# Patient Record
Sex: FEMALE | Race: WHITE | Employment: OTHER | URBAN - NONMETROPOLITAN AREA
[De-identification: names, ages, dates, MRNs, and addresses within clinical notes are randomized per-mention and may not be internally consistent; named-entity substitution may affect disease eponyms.]

---

## 2017-09-18 ENCOUNTER — HOSPITAL ENCOUNTER (OUTPATIENT)
Age: 72
Discharge: HOME | End: 2017-09-18
Payer: MEDICARE

## 2017-09-18 ENCOUNTER — OFFICE VISIT (OUTPATIENT)
Dept: CARDIOLOGY CLINIC | Age: 72
End: 2017-09-18
Payer: MEDICARE

## 2017-09-18 ENCOUNTER — HOSPITAL ENCOUNTER (OUTPATIENT)
Dept: GENERAL RADIOLOGY | Age: 72
Discharge: HOME | End: 2017-09-18
Payer: MEDICARE

## 2017-09-18 VITALS
HEART RATE: 78 BPM | WEIGHT: 210 LBS | HEIGHT: 63 IN | SYSTOLIC BLOOD PRESSURE: 138 MMHG | DIASTOLIC BLOOD PRESSURE: 80 MMHG | BODY MASS INDEX: 37.21 KG/M2

## 2017-09-18 DIAGNOSIS — E78.01 FAMILIAL HYPERCHOLESTEROLEMIA: ICD-10-CM

## 2017-09-18 DIAGNOSIS — Z01.818 PREOP EXAMINATION: ICD-10-CM

## 2017-09-18 DIAGNOSIS — I25.10 CORONARY ARTERY DISEASE INVOLVING NATIVE CORONARY ARTERY OF NATIVE HEART WITHOUT ANGINA PECTORIS: ICD-10-CM

## 2017-09-18 DIAGNOSIS — I10 ESSENTIAL HYPERTENSION: Primary | ICD-10-CM

## 2017-09-18 PROCEDURE — 93000 ELECTROCARDIOGRAM COMPLETE: CPT | Performed by: NUCLEAR MEDICINE

## 2017-09-18 PROCEDURE — 3014F SCREEN MAMMO DOC REV: CPT | Performed by: NUCLEAR MEDICINE

## 2017-09-18 PROCEDURE — 3017F COLORECTAL CA SCREEN DOC REV: CPT | Performed by: NUCLEAR MEDICINE

## 2017-09-18 PROCEDURE — 4040F PNEUMOC VAC/ADMIN/RCVD: CPT | Performed by: NUCLEAR MEDICINE

## 2017-09-18 PROCEDURE — 1036F TOBACCO NON-USER: CPT | Performed by: NUCLEAR MEDICINE

## 2017-09-18 PROCEDURE — 1123F ACP DISCUSS/DSCN MKR DOCD: CPT | Performed by: NUCLEAR MEDICINE

## 2017-09-18 PROCEDURE — 71020 XR CHEST STANDARD TWO VW: CPT

## 2017-09-18 PROCEDURE — G8427 DOCREV CUR MEDS BY ELIG CLIN: HCPCS | Performed by: NUCLEAR MEDICINE

## 2017-09-18 PROCEDURE — G8417 CALC BMI ABV UP PARAM F/U: HCPCS | Performed by: NUCLEAR MEDICINE

## 2017-09-18 PROCEDURE — G8400 PT W/DXA NO RESULTS DOC: HCPCS | Performed by: NUCLEAR MEDICINE

## 2017-09-18 PROCEDURE — 99214 OFFICE O/P EST MOD 30 MIN: CPT | Performed by: NUCLEAR MEDICINE

## 2017-09-18 PROCEDURE — 1090F PRES/ABSN URINE INCON ASSESS: CPT | Performed by: NUCLEAR MEDICINE

## 2017-09-18 PROCEDURE — G8598 ASA/ANTIPLAT THER USED: HCPCS | Performed by: NUCLEAR MEDICINE

## 2017-09-19 ENCOUNTER — TELEPHONE (OUTPATIENT)
Dept: CARDIOLOGY CLINIC | Age: 72
End: 2017-09-19

## 2018-03-06 ENCOUNTER — TELEPHONE (OUTPATIENT)
Dept: CARDIOLOGY CLINIC | Age: 73
End: 2018-03-06

## 2018-03-06 DIAGNOSIS — I25.10 CORONARY ARTERY DISEASE INVOLVING NATIVE CORONARY ARTERY OF NATIVE HEART WITHOUT ANGINA PECTORIS: Primary | ICD-10-CM

## 2018-03-09 LAB
ANION GAP SERPL CALCULATED.3IONS-SCNC: 13 MEQ/L (ref 10–19)
BUN BLDV-MCNC: 14 MG/DL (ref 8–23)
CALCIUM SERPL-MCNC: 9.4 MG/DL (ref 8.5–10.5)
CHLORIDE BLD-SCNC: 98 MEQ/L (ref 95–107)
CO2: 28 MEQ/L (ref 19–31)
CREAT SERPL-MCNC: 1.1 MG/DL (ref 0.6–1.3)
EGFR AFRICAN AMERICAN: 57.7 ML/MIN/1.73 M2
EGFR IF NONAFRICAN AMERICAN: 49.8 ML/MIN/1.73 M2
GLUCOSE: 217 MG/DL (ref 70–99)
MAGNESIUM: 2.3 MG/DL (ref 1.6–2.6)
POTASSIUM SERPL-SCNC: 4.7 MEQ/L (ref 3.5–5.4)
SODIUM BLD-SCNC: 139 MEQ/L (ref 135–146)

## 2018-03-21 ENCOUNTER — TELEPHONE (OUTPATIENT)
Dept: CARDIOLOGY CLINIC | Age: 73
End: 2018-03-21

## 2018-09-17 ENCOUNTER — OFFICE VISIT (OUTPATIENT)
Dept: CARDIOLOGY CLINIC | Age: 73
End: 2018-09-17
Payer: MEDICARE

## 2018-09-17 VITALS
DIASTOLIC BLOOD PRESSURE: 70 MMHG | SYSTOLIC BLOOD PRESSURE: 142 MMHG | HEART RATE: 70 BPM | BODY MASS INDEX: 37.8 KG/M2 | WEIGHT: 205.4 LBS | HEIGHT: 62 IN

## 2018-09-17 DIAGNOSIS — E78.01 FAMILIAL HYPERCHOLESTEROLEMIA: ICD-10-CM

## 2018-09-17 DIAGNOSIS — I25.10 CORONARY ARTERY DISEASE INVOLVING NATIVE CORONARY ARTERY OF NATIVE HEART WITHOUT ANGINA PECTORIS: Primary | ICD-10-CM

## 2018-09-17 DIAGNOSIS — I10 ESSENTIAL HYPERTENSION: ICD-10-CM

## 2018-09-17 PROCEDURE — G8427 DOCREV CUR MEDS BY ELIG CLIN: HCPCS | Performed by: NUCLEAR MEDICINE

## 2018-09-17 PROCEDURE — 1101F PT FALLS ASSESS-DOCD LE1/YR: CPT | Performed by: NUCLEAR MEDICINE

## 2018-09-17 PROCEDURE — 1090F PRES/ABSN URINE INCON ASSESS: CPT | Performed by: NUCLEAR MEDICINE

## 2018-09-17 PROCEDURE — G8598 ASA/ANTIPLAT THER USED: HCPCS | Performed by: NUCLEAR MEDICINE

## 2018-09-17 PROCEDURE — 3017F COLORECTAL CA SCREEN DOC REV: CPT | Performed by: NUCLEAR MEDICINE

## 2018-09-17 PROCEDURE — 99214 OFFICE O/P EST MOD 30 MIN: CPT | Performed by: NUCLEAR MEDICINE

## 2018-09-17 PROCEDURE — 93000 ELECTROCARDIOGRAM COMPLETE: CPT | Performed by: NUCLEAR MEDICINE

## 2018-09-17 PROCEDURE — 4040F PNEUMOC VAC/ADMIN/RCVD: CPT | Performed by: NUCLEAR MEDICINE

## 2018-09-17 PROCEDURE — G8417 CALC BMI ABV UP PARAM F/U: HCPCS | Performed by: NUCLEAR MEDICINE

## 2018-09-17 PROCEDURE — 1123F ACP DISCUSS/DSCN MKR DOCD: CPT | Performed by: NUCLEAR MEDICINE

## 2018-09-17 PROCEDURE — G8400 PT W/DXA NO RESULTS DOC: HCPCS | Performed by: NUCLEAR MEDICINE

## 2018-09-17 PROCEDURE — 1036F TOBACCO NON-USER: CPT | Performed by: NUCLEAR MEDICINE

## 2018-09-17 NOTE — PROGRESS NOTES
[address] CARDIOLOGY  [facility]  [address] [address]  Dept: [phone]  Dept Fax: [phone]  Loc: [phone]    Visit Date: 9/17/2018    Sanket Rosado is a 67 y.o. female who presents today for:  Chief Complaint   Patient presents with    1 Year Follow Up    Coronary Artery Disease    Hypertension    Hyperlipidemia     No chest pain  Known CABG 2014  No changes in breathing  Some GI issues  Diverticulitis  BP is good    Some epigastric and back pain   Off of Repatha due to ? ? Side effects   HPI:  HPI  Past Medical History:   Diagnosis Date    Arthritis     Asthma     CAD (coronary artery disease)     Deep vein thrombosis (DVT) (HCC)     Escherichia coli infection     H/O coagulation disorder 5/30/2014    HLD (hyperlipidemia)     HTN (hypertension)     Hx of blood clots     Joint pain     Morbid [facility] ([facility]     NSTEMI (non-ST elevated myocardial infarction) ([facility] 5/30/2014    S/P cardiac catheterization: 5/30/2014: 99% distal RCA, 90% mid-LAD, 85% ostial diagonal, 60% LCx. 5/30/2014 5/30/2014: 99% distal RCA, 90% mid-LAD, 85% ostial diagonal, 60% LCx. Dr. Sangeeta Crawford Stroke [facility])     Swelling     Wheezing       Past Surgical History:   Procedure Laterality Date    ANKLE SURGERY  1990    CARDIOVASCULAR STRESS TEST  4 22 2010    No obvious stress induced ischemia. EF 71%.  CORONARY ARTERY BYPASS GRAFT  6-3-14     x4    DIAGNOSTIC CARDIAC CATH LAB PROCEDURE      EYE SURGERY      HYSTERECTOMY  1972    TRANSTHORACIC ECHOCARDIOGRAM  4 22 2010    Normal LV size and systolic function. EF 55-65%. Mild TR.       Family History   Problem Relation Age of Onset    Heart Disease Mother     Diabetes Mother     Heart Disease Father      Social History   Substance Use Topics    Smoking status: Former Smoker     Packs/day: 0.20     Years: 24.00     Types: Cigarettes     Quit date: 6/21/1982    Smokeless tobacco: Never Used   [facility] microalbuminuria test  04/21/2018    Flu vaccine (1) 09/01/2018    A1C test (Diabetic or Prediabetic)  06/01/2019    Lipid screen  06/01/2019    Potassium monitoring  06/01/2019    Creatinine monitoring  06/01/2019       Subjective:  Review of Systems  General:   No fever, no chills, No fatigue or weight loss  Pulmonary:    No dyspnea, no wheezing  Cardiac:    Denies recent chest pain,   GI:     No nausea or vomiting, no abdominal pain  Neuro:    No dizziness or light headedness,   Musculoskeletal:  No recent active issues  Extremities:   No edema, good peripheral pulses      Objective:  Physical Exam  BP (!) 144/60   Pulse 70   Ht 5' 2\" (1.575 m)   Wt 205 lb 6.4 oz (93.2 kg)   BMI 37.57 kg/m²   General:   Well developed, well nourished  Lungs:   Clear to auscultation  Heart:    Normal S1 S2, Slight murmur. no rubs, no gallops  Abdomen:   Soft, non tender, no organomegalies, positive bowel sounds  Extremities:   No edema, no cyanosis, good peripheral pulses  Neurological:   Awake, alert, oriented. No obvious focal deficits  Musculoskelatal:  No obvious deformities    Assessment:      Diagnosis Orders   1. Coronary artery disease involving native coronary artery of native heart without angina pectoris  EKG 12 lead   2. Essential hypertension     3. Familial hypercholesterolemia     cardiac higher risk patient   different symptoms   ECG in office was done today. I reviewed the ECG. No acute findings    Plan:  No Follow-up on file. I have spent 25 minutes face to face with the patient. More than 50% of this time was spent counseling and coordinating care.     Discussed a stress test given her risk   She wants to wait   She feels that her GI issues is priority   I am worried that some of the symptoms could be cardiac  Patient was advised to report to the ER if he has recurrent symptoms with specific instructions given about severity and duration of symptoms  Continue risk factor modification and medical management  Thank you for allowing me to participate in the care of your patient. Please don't hesitate to contact me regarding any further issues related to the patient care    Orders Placed:  Orders Placed This Encounter   Procedures    EKG 12 lead     Order Specific Question:   Reason for Exam?     Answer: Other       Medications Prescribed:  No orders of the defined types were placed in this encounter. Discussed use, benefit, and side effects of prescribed medications. All patient questions answered. Pt voiced understanding. Instructed to continue current medications, diet and exercise. Continue risk factor modification and medical management. Patient agreed with treatment plan. Follow up as directed.     Electronically signed by Leana Leavitt MD on 9/17/2018 at 10:37 AM

## 2019-07-01 ENCOUNTER — HOSPITAL ENCOUNTER (OUTPATIENT)
Age: 74
Discharge: HOME | End: 2019-07-01
Payer: MEDICARE

## 2019-07-01 ENCOUNTER — HOSPITAL ENCOUNTER (OUTPATIENT)
Dept: GENERAL RADIOLOGY | Age: 74
Discharge: HOME | End: 2019-07-01
Payer: MEDICARE

## 2019-07-01 DIAGNOSIS — Z01.810 PRE-OPERATIVE CARDIOVASCULAR EXAMINATION: ICD-10-CM

## 2019-07-01 PROCEDURE — 71046 X-RAY EXAM CHEST 2 VIEWS: CPT

## 2019-09-16 ENCOUNTER — OFFICE VISIT (OUTPATIENT)
Dept: CARDIOLOGY CLINIC | Age: 74
End: 2019-09-16
Payer: MEDICARE

## 2019-09-16 VITALS
DIASTOLIC BLOOD PRESSURE: 58 MMHG | HEART RATE: 60 BPM | WEIGHT: 201.8 LBS | BODY MASS INDEX: 37.13 KG/M2 | HEIGHT: 62 IN | SYSTOLIC BLOOD PRESSURE: 128 MMHG

## 2019-09-16 DIAGNOSIS — E78.01 FAMILIAL HYPERCHOLESTEROLEMIA: ICD-10-CM

## 2019-09-16 DIAGNOSIS — I25.810 CORONARY ARTERY DISEASE INVOLVING CORONARY BYPASS GRAFT OF NATIVE HEART WITHOUT ANGINA PECTORIS: ICD-10-CM

## 2019-09-16 DIAGNOSIS — I10 ESSENTIAL HYPERTENSION: Primary | ICD-10-CM

## 2019-09-16 PROCEDURE — 99213 OFFICE O/P EST LOW 20 MIN: CPT | Performed by: NUCLEAR MEDICINE

## 2019-09-16 PROCEDURE — G8400 PT W/DXA NO RESULTS DOC: HCPCS | Performed by: NUCLEAR MEDICINE

## 2019-09-16 PROCEDURE — G8598 ASA/ANTIPLAT THER USED: HCPCS | Performed by: NUCLEAR MEDICINE

## 2019-09-16 PROCEDURE — 1036F TOBACCO NON-USER: CPT | Performed by: NUCLEAR MEDICINE

## 2019-09-16 PROCEDURE — G8417 CALC BMI ABV UP PARAM F/U: HCPCS | Performed by: NUCLEAR MEDICINE

## 2019-09-16 PROCEDURE — G8428 CUR MEDS NOT DOCUMENT: HCPCS | Performed by: NUCLEAR MEDICINE

## 2019-09-16 PROCEDURE — 3017F COLORECTAL CA SCREEN DOC REV: CPT | Performed by: NUCLEAR MEDICINE

## 2019-09-16 PROCEDURE — 1123F ACP DISCUSS/DSCN MKR DOCD: CPT | Performed by: NUCLEAR MEDICINE

## 2019-09-16 PROCEDURE — 1090F PRES/ABSN URINE INCON ASSESS: CPT | Performed by: NUCLEAR MEDICINE

## 2019-09-16 PROCEDURE — 93000 ELECTROCARDIOGRAM COMPLETE: CPT | Performed by: NUCLEAR MEDICINE

## 2019-09-16 PROCEDURE — 4040F PNEUMOC VAC/ADMIN/RCVD: CPT | Performed by: NUCLEAR MEDICINE

## 2019-09-16 NOTE — PROGRESS NOTES
defined types were placed in this encounter. Discussed use, benefit, and side effects of prescribed medications. All patient questions answered. Pt voicedunderstanding. Instructed to continue current medications, diet and exercise. Continue risk factor modification and medical management. Patient agreed with treatment plan. Follow up as directed.     Electronically signedby Rafael Jacobs MD on 9/16/2019 at 11:24 AM

## 2019-12-03 ENCOUNTER — HOSPITAL ENCOUNTER (OUTPATIENT)
Dept: INTERVENTIONAL RADIOLOGY/VASCULAR | Age: 74
Discharge: HOME | End: 2019-12-03
Payer: MEDICARE

## 2019-12-03 DIAGNOSIS — R09.89 BILATERAL CAROTID BRUITS: ICD-10-CM

## 2019-12-03 PROCEDURE — 93880 EXTRACRANIAL BILAT STUDY: CPT

## 2020-01-21 ENCOUNTER — HOSPITAL ENCOUNTER (EMERGENCY)
Age: 75
Discharge: HOME | End: 2020-01-21
Attending: EMERGENCY MEDICINE
Payer: MEDICARE

## 2020-01-21 ENCOUNTER — APPOINTMENT (OUTPATIENT)
Dept: GENERAL RADIOLOGY | Age: 75
End: 2020-01-21
Payer: MEDICARE

## 2020-01-21 VITALS
TEMPERATURE: 97.6 F | HEART RATE: 75 BPM | WEIGHT: 200 LBS | HEIGHT: 62 IN | BODY MASS INDEX: 36.8 KG/M2 | RESPIRATION RATE: 18 BRPM | DIASTOLIC BLOOD PRESSURE: 58 MMHG | OXYGEN SATURATION: 97 % | SYSTOLIC BLOOD PRESSURE: 120 MMHG

## 2020-01-21 LAB
ALBUMIN SERPL-MCNC: 4.5 G/DL (ref 3.5–5.1)
ALBUMIN SERPL-MCNC: 4.6 G/DL (ref 3.5–5.1)
ALP BLD-CCNC: 68 U/L (ref 38–126)
ALP BLD-CCNC: 68 U/L (ref 38–126)
ALT SERPL-CCNC: 12 U/L (ref 11–66)
ALT SERPL-CCNC: 12 U/L (ref 11–66)
ANION GAP SERPL CALCULATED.3IONS-SCNC: 15 MEQ/L (ref 8–16)
APTT: 41.3 SECONDS (ref 22–38)
AST SERPL-CCNC: 18 U/L (ref 5–40)
AST SERPL-CCNC: 18 U/L (ref 5–40)
BACTERIA: ABNORMAL /HPF
BASOPHILS # BLD: 0.6 %
BASOPHILS ABSOLUTE: 0 THOU/MM3 (ref 0–0.1)
BILIRUB SERPL-MCNC: 0.8 MG/DL (ref 0.3–1.2)
BILIRUB SERPL-MCNC: 0.8 MG/DL (ref 0.3–1.2)
BILIRUBIN DIRECT: < 0.2 MG/DL (ref 0–0.3)
BILIRUBIN URINE: NEGATIVE
BLOOD, URINE: NEGATIVE
BUN BLDV-MCNC: 25 MG/DL (ref 7–22)
CALCIUM SERPL-MCNC: 9.5 MG/DL (ref 8.5–10.5)
CASTS 2: ABNORMAL /LPF
CASTS UA: ABNORMAL /LPF
CHARACTER, URINE: ABNORMAL
CHLORIDE BLD-SCNC: 98 MEQ/L (ref 98–111)
CO2: 26 MEQ/L (ref 23–33)
COLOR: YELLOW
CREAT SERPL-MCNC: 1 MG/DL (ref 0.4–1.2)
CRYSTALS, UA: ABNORMAL
EKG ATRIAL RATE: 66 BPM
EKG P AXIS: 43 DEGREES
EKG P-R INTERVAL: 174 MS
EKG Q-T INTERVAL: 404 MS
EKG QRS DURATION: 86 MS
EKG QTC CALCULATION (BAZETT): 423 MS
EKG R AXIS: -35 DEGREES
EKG T AXIS: 36 DEGREES
EKG VENTRICULAR RATE: 66 BPM
EOSINOPHIL # BLD: 1.7 %
EOSINOPHILS ABSOLUTE: 0.1 THOU/MM3 (ref 0–0.4)
EPITHELIAL CELLS, UA: ABNORMAL /HPF
ERYTHROCYTE [DISTWIDTH] IN BLOOD BY AUTOMATED COUNT: 13.3 % (ref 11.5–14.5)
ERYTHROCYTE [DISTWIDTH] IN BLOOD BY AUTOMATED COUNT: 45.6 FL (ref 35–45)
GFR SERPL CREATININE-BSD FRML MDRD: 54 ML/MIN/1.73M2
GLUCOSE BLD-MCNC: 170 MG/DL (ref 70–108)
GLUCOSE URINE: NEGATIVE MG/DL
HCT VFR BLD CALC: 43.4 % (ref 37–47)
HEMOGLOBIN: 14.6 GM/DL (ref 12–16)
IMMATURE GRANS (ABS): 0.03 THOU/MM3 (ref 0–0.07)
IMMATURE GRANULOCYTES: 0.5 %
INR BLD: 2.53 (ref 0.85–1.13)
KETONES, URINE: NEGATIVE
LEUKOCYTE ESTERASE, URINE: ABNORMAL
LIPASE: 34.8 U/L (ref 5.6–51.3)
LYMPHOCYTES # BLD: 38.7 %
LYMPHOCYTES ABSOLUTE: 2.5 THOU/MM3 (ref 1–4.8)
MCH RBC QN AUTO: 31.3 PG (ref 26–33)
MCHC RBC AUTO-ENTMCNC: 33.6 GM/DL (ref 32.2–35.5)
MCV RBC AUTO: 93.1 FL (ref 81–99)
MISCELLANEOUS 2: ABNORMAL
MONOCYTES # BLD: 8 %
MONOCYTES ABSOLUTE: 0.5 THOU/MM3 (ref 0.4–1.3)
NITRITE, URINE: NEGATIVE
NUCLEATED RED BLOOD CELLS: 0 /100 WBC
OSMOLALITY CALCULATION: 285.9 MOSMOL/KG (ref 275–300)
PH UA: 7 (ref 5–9)
PLATELET # BLD: 181 THOU/MM3 (ref 130–400)
PMV BLD AUTO: 9.8 FL (ref 9.4–12.4)
POTASSIUM REFLEX MAGNESIUM: 4.2 MEQ/L (ref 3.5–5.2)
PROTEIN UA: NEGATIVE
RBC # BLD: 4.66 MILL/MM3 (ref 4.2–5.4)
RBC URINE: ABNORMAL /HPF
RENAL EPITHELIAL, UA: ABNORMAL
SEG NEUTROPHILS: 50.5 %
SEGMENTED NEUTROPHILS ABSOLUTE COUNT: 3.3 THOU/MM3 (ref 1.8–7.7)
SODIUM BLD-SCNC: 139 MEQ/L (ref 135–145)
SPECIFIC GRAVITY, URINE: 1.01 (ref 1–1.03)
TOTAL PROTEIN: 7.9 G/DL (ref 6.1–8)
TOTAL PROTEIN: 7.9 G/DL (ref 6.1–8)
TROPONIN T: < 0.01 NG/ML
TROPONIN T: < 0.01 NG/ML
UROBILINOGEN, URINE: 0.2 EU/DL (ref 0–1)
WBC # BLD: 6.5 THOU/MM3 (ref 4.8–10.8)
WBC UA: ABNORMAL /HPF
YEAST: ABNORMAL

## 2020-01-21 PROCEDURE — 96374 THER/PROPH/DIAG INJ IV PUSH: CPT

## 2020-01-21 PROCEDURE — 84484 ASSAY OF TROPONIN QUANT: CPT

## 2020-01-21 PROCEDURE — 83690 ASSAY OF LIPASE: CPT

## 2020-01-21 PROCEDURE — 85025 COMPLETE CBC W/AUTO DIFF WBC: CPT

## 2020-01-21 PROCEDURE — 87077 CULTURE AEROBIC IDENTIFY: CPT

## 2020-01-21 PROCEDURE — 99285 EMERGENCY DEPT VISIT HI MDM: CPT

## 2020-01-21 PROCEDURE — 80076 HEPATIC FUNCTION PANEL: CPT

## 2020-01-21 PROCEDURE — 2709999900 HC NON-CHARGEABLE SUPPLY

## 2020-01-21 PROCEDURE — 2500000003 HC RX 250 WO HCPCS: Performed by: EMERGENCY MEDICINE

## 2020-01-21 PROCEDURE — 2580000003 HC RX 258: Performed by: EMERGENCY MEDICINE

## 2020-01-21 PROCEDURE — 87086 URINE CULTURE/COLONY COUNT: CPT

## 2020-01-21 PROCEDURE — 87186 SC STD MICRODIL/AGAR DIL: CPT

## 2020-01-21 PROCEDURE — 85610 PROTHROMBIN TIME: CPT

## 2020-01-21 PROCEDURE — 93005 ELECTROCARDIOGRAM TRACING: CPT | Performed by: EMERGENCY MEDICINE

## 2020-01-21 PROCEDURE — 85730 THROMBOPLASTIN TIME PARTIAL: CPT

## 2020-01-21 PROCEDURE — 6370000000 HC RX 637 (ALT 250 FOR IP): Performed by: EMERGENCY MEDICINE

## 2020-01-21 PROCEDURE — 80053 COMPREHEN METABOLIC PANEL: CPT

## 2020-01-21 PROCEDURE — 81001 URINALYSIS AUTO W/SCOPE: CPT

## 2020-01-21 PROCEDURE — 74022 RADEX COMPL AQT ABD SERIES: CPT

## 2020-01-21 PROCEDURE — 36415 COLL VENOUS BLD VENIPUNCTURE: CPT

## 2020-01-21 RX ORDER — SUCRALFATE 1 G/1
1 TABLET ORAL 4 TIMES DAILY
Qty: 56 TABLET | Refills: 0 | Status: SHIPPED | OUTPATIENT
Start: 2020-01-21 | End: 2020-09-21

## 2020-01-21 RX ORDER — SODIUM CHLORIDE 9 MG/ML
1000 INJECTION, SOLUTION INTRAVENOUS CONTINUOUS
Status: DISCONTINUED | OUTPATIENT
Start: 2020-01-21 | End: 2020-01-21 | Stop reason: HOSPADM

## 2020-01-21 RX ORDER — PANTOPRAZOLE SODIUM 20 MG/1
40 TABLET, DELAYED RELEASE ORAL DAILY
Qty: 30 TABLET | Refills: 0 | Status: SHIPPED | OUTPATIENT
Start: 2020-01-21

## 2020-01-21 RX ORDER — NITROGLYCERIN 0.4 MG/1
0.4 TABLET SUBLINGUAL EVERY 5 MIN PRN
Status: DISCONTINUED | OUTPATIENT
Start: 2020-01-21 | End: 2020-01-21 | Stop reason: HOSPADM

## 2020-01-21 RX ADMIN — FAMOTIDINE 20 MG: 10 INJECTION, SOLUTION INTRAVENOUS at 13:50

## 2020-01-21 RX ADMIN — NITROGLYCERIN 0.4 MG: 0.4 TABLET, ORALLY DISINTEGRATING SUBLINGUAL at 11:36

## 2020-01-21 RX ADMIN — LIDOCAINE HYDROCHLORIDE: 20 SOLUTION ORAL; TOPICAL at 13:15

## 2020-01-21 RX ADMIN — SODIUM CHLORIDE 1000 ML: 9 INJECTION, SOLUTION INTRAVENOUS at 11:36

## 2020-01-21 ASSESSMENT — PAIN SCALES - GENERAL
PAINLEVEL_OUTOF10: 8
PAINLEVEL_OUTOF10: 7
PAINLEVEL_OUTOF10: 4

## 2020-01-21 ASSESSMENT — ENCOUNTER SYMPTOMS
NAUSEA: 0
VOMITING: 0
SHORTNESS OF BREATH: 0
ABDOMINAL PAIN: 1
BLOOD IN STOOL: 0

## 2020-01-21 ASSESSMENT — PAIN DESCRIPTION - ORIENTATION: ORIENTATION: UPPER

## 2020-01-21 ASSESSMENT — PAIN DESCRIPTION - LOCATION: LOCATION: ABDOMEN

## 2020-01-21 ASSESSMENT — PAIN DESCRIPTION - FREQUENCY: FREQUENCY: CONTINUOUS

## 2020-01-21 ASSESSMENT — PAIN DESCRIPTION - PAIN TYPE: TYPE: ACUTE PAIN

## 2020-01-21 ASSESSMENT — PAIN DESCRIPTION - DESCRIPTORS: DESCRIPTORS: OTHER (COMMENT)

## 2020-01-21 NOTE — ED PROVIDER NOTES
murmur. No friction rub. No gallop. Pulmonary:      Effort: Pulmonary effort is normal. No respiratory distress. Breath sounds: Normal breath sounds. No decreased breath sounds, wheezing, rhonchi or rales. Abdominal:      General: Bowel sounds are normal. There is no distension. Palpations: Abdomen is soft. Tenderness: There is no tenderness. There is no guarding or rebound. Musculoskeletal: Normal range of motion. Right lower leg: No edema. Left lower leg: No edema. Skin:     General: Skin is warm and dry. Findings: No rash. Neurological:      Mental Status: She is alert and oriented to person, place, and time. Motor: No abnormal muscle tone. Coordination: Coordination normal.         DIFFERENTIAL DIAGNOSIS:   Including but not limited to GERD, ACS, less likely PE, dissection, and pancreatitis. DIAGNOSTIC RESULTS     EKG: All EKG's are interpreted by the Emergency Department Physician who either signs or Co-signs this chart in the absence of a cardiologist.  EKG interpreted by Jose Miguel White, DO:    Vent. Rate: 66 bpm  FL interval: 174 ms  QRS duration: 86 ms  QTc: 423 ms  P-R-T axes: 43, -35, 36  Normal sinus rhythm  Left axis deviatition  Anterolateral infarct, age undetermined   Abnormal ECG   No STEMI     RADIOLOGY: non-plain film images(s) such as CT, Ultrasound and MRI are read by the radiologist.    XR Acute Abd Series Chest 1 VW   Final Result   1. No acute cardiopulmonary process. 2.Nonobstructive bowel gas pattern. **This report has been created using voice recognition software. It may contain minor errors which are inherent in voice recognition technology. **      Final report electronically signed by Dr. Akira Cohn on 1/21/2020 11:46 AM           LABS:   Labs Reviewed   CBC WITH AUTO DIFFERENTIAL - Abnormal; Notable for the following components:       Result Value    RDW-SD 45.6 (*)     All other components within normal limits

## 2020-01-21 NOTE — ED TRIAGE NOTES
Pt presents to ED c/c sudden onset of epigastric pain. Pt states she sat down when the pain began and it made it worse. Pt denies chest pain, denies sob.

## 2020-01-22 ENCOUNTER — OFFICE VISIT (OUTPATIENT)
Dept: CARDIOLOGY CLINIC | Age: 75
End: 2020-01-22
Payer: MEDICARE

## 2020-01-22 VITALS
HEIGHT: 62 IN | HEART RATE: 80 BPM | SYSTOLIC BLOOD PRESSURE: 130 MMHG | DIASTOLIC BLOOD PRESSURE: 62 MMHG | BODY MASS INDEX: 35.99 KG/M2 | WEIGHT: 195.6 LBS

## 2020-01-22 PROCEDURE — 1123F ACP DISCUSS/DSCN MKR DOCD: CPT | Performed by: NUCLEAR MEDICINE

## 2020-01-22 PROCEDURE — G8400 PT W/DXA NO RESULTS DOC: HCPCS | Performed by: NUCLEAR MEDICINE

## 2020-01-22 PROCEDURE — 99214 OFFICE O/P EST MOD 30 MIN: CPT | Performed by: NUCLEAR MEDICINE

## 2020-01-22 PROCEDURE — 3017F COLORECTAL CA SCREEN DOC REV: CPT | Performed by: NUCLEAR MEDICINE

## 2020-01-22 PROCEDURE — 1090F PRES/ABSN URINE INCON ASSESS: CPT | Performed by: NUCLEAR MEDICINE

## 2020-01-22 PROCEDURE — G8417 CALC BMI ABV UP PARAM F/U: HCPCS | Performed by: NUCLEAR MEDICINE

## 2020-01-22 PROCEDURE — G8427 DOCREV CUR MEDS BY ELIG CLIN: HCPCS | Performed by: NUCLEAR MEDICINE

## 2020-01-22 PROCEDURE — 4040F PNEUMOC VAC/ADMIN/RCVD: CPT | Performed by: NUCLEAR MEDICINE

## 2020-01-22 PROCEDURE — G8484 FLU IMMUNIZE NO ADMIN: HCPCS | Performed by: NUCLEAR MEDICINE

## 2020-01-22 PROCEDURE — 1036F TOBACCO NON-USER: CPT | Performed by: NUCLEAR MEDICINE

## 2020-01-22 RX ORDER — ISOSORBIDE MONONITRATE 30 MG/1
30 TABLET, EXTENDED RELEASE ORAL DAILY
Qty: 30 TABLET | Refills: 11 | Status: SHIPPED | OUTPATIENT
Start: 2020-01-22

## 2020-01-22 RX ORDER — ISOSORBIDE MONONITRATE 30 MG/1
30 TABLET, EXTENDED RELEASE ORAL DAILY
COMMUNITY
End: 2020-01-22 | Stop reason: SDUPTHER

## 2020-01-22 ASSESSMENT — ENCOUNTER SYMPTOMS
SORE THROAT: 0
ABDOMINAL DISTENTION: 0
BACK PAIN: 0
CONSTIPATION: 0
CHEST TIGHTNESS: 0
SINUS PRESSURE: 0
EYE REDNESS: 0
RHINORRHEA: 0
WHEEZING: 0
DIARRHEA: 0

## 2020-01-22 NOTE — PROGRESS NOTES
[facility],[address]  [address] [place] [address]  Dept: [phone]  Dept Fax: [phone]  Loc: [phone]    Visit Date: 1/22/2020    Nina Peralta is a 75 y.o. female who presents todayfor:  Chief Complaint   Patient presents with    [facility] Coronary Artery Disease    Hyperlipidemia    Chest Pain     Here from the ER   Was there for chest pain  Sudden onset  Middle chest   Heavy feeling like  Associated dizziness  Lasted for several minutes to hour  Did have nitro   Not much help   GI cocktail helped  No radiation   No triggers  Was resting   TN were okay   Was given the options to be admitted  She didn't   Known CABG 2014  Had some palpitation and chest pain before  Had another episode  Issues with statins  Tried multiple   Issues with repatha   Very high risk patient  BP is stable    HPI:  HPI  Past Medical History:   Diagnosis Date    Arthritis     Asthma     CAD (coronary artery disease)     Deep vein thrombosis (DVT) ([address]     Escherichia coli infection     H/O coagulation disorder 5/30/2014    HLD (hyperlipidemia)     HTN (hypertension)     Hx of blood clots     Joint pain     Morbid obesity ([address]     NSTEMI (non-ST elevated myocardial infarction) ([address] 5/30/2014    S/P cardiac catheterization: 5/30/2014: 99% distal RCA, 90% mid-LAD, 85% ostial diagonal, 60% LCx. 5/30/2014 5/30/2014: 99% distal RCA, 90% mid-LAD, 85% ostial diagonal, 60% LCx. Dr. Tay Covington Stroke [facility])     Swelling     Wheezing       Past Surgical History:   Procedure Laterality Date    ANKLE SURGERY  1990    CARDIOVASCULAR STRESS TEST  4 22 2010    No obvious stress induced ischemia. EF 71%.  CORONARY ARTERY BYPASS GRAFT  6-3-14     x4    DIAGNOSTIC CARDIAC CATH LAB PROCEDURE      EYE SURGERY      HYSTERECTOMY  1972    TRANSTHORACIC ECHOCARDIOGRAM  4 22 2010    Normal LV size and systolic function.  EF Plan:  No follow-ups on file. Discussed  Add nitrates  Non invasive cardiac testing will be ordered to further evaluate for any ischemic or structural heart disease as a cause of the patient symptoms. We will proceed with a Stress Cardiolite test and echo soon. Cath if needed  Continue risk factor modification and medical management  Thank you for allowing me to participate in the care of your patient. Please don't hesitate to contact me regarding any further issues related to the patient care    Orders Placed:  No orders of the defined types were placed in this encounter. Medications Prescribed:  No orders of the defined types were placed in this encounter. Discussed use, benefit, and side effects of prescribed medications. All patient questions answered. Pt voicedunderstanding. Instructed to continue current medications, diet and exercise. Continue risk factor modification and medical management. Patient agreed with treatment plan. Follow up as directed.     Electronically signedby Kylee Smith MD on 1/22/2020 at 10:37 AM

## 2020-01-23 LAB
ORGANISM: ABNORMAL
URINE CULTURE REFLEX: ABNORMAL

## 2020-01-24 NOTE — PROGRESS NOTES
Pharmacy Note  ED Culture Follow-up    Niru Galicia is a 75 y.o. female. Allergies: Aspirin; Codeine; Darvocet [propoxyphene n-acetaminophen]; Darvon [propoxyphene hcl]; Humibid-dm [dextromethorphan-guaifenesin]; Macrobid [nitrofurantoin monohydrate macrocrystals]; Other; Pcn [penicillins]; Phenobarbital; Statins; Sulfa antibiotics; and Tessalon [benzonatate]     Labs:  Lab Results   Component Value Date    BUN 25 (H) 01/21/2020    CREATININE 1.0 01/21/2020    WBC 6.5 01/21/2020     Estimated Creatinine Clearance: 51 mL/min (based on SCr of 1 mg/dL). Current antimicrobials:   None    ASSESSMENT:  Micro results:   Urine culture: positive for E coli      PLAN:  Need for intervention: Yes  Discussed with:   pJ Madrid PA-C  Chosen treatment:    If symptomatic will start Cefdinir 300 mg BID x 5 days     Patient response:   Called and spoke with patient. Gastric pain has resolved, no new symptoms, no urinary symptoms. No treatment indicated for asymptomatic bacteriuria. Called/sent in prescription to: Not applicable    Please call with any questions.  Ext. [phone]    Dawn Moore, PharmD  1/24/2020  3:57 PM

## 2020-01-31 ENCOUNTER — HOSPITAL ENCOUNTER (OUTPATIENT)
Dept: NON INVASIVE DIAGNOSTICS | Age: 75
Discharge: HOME | End: 2020-01-31
Payer: MEDICARE

## 2020-01-31 VITALS — BODY MASS INDEX: 35.88 KG/M2 | HEIGHT: 62 IN | WEIGHT: 195 LBS

## 2020-01-31 LAB
LV EF: 55 %
LVEF MODALITY: NORMAL

## 2020-01-31 PROCEDURE — A9500 TC99M SESTAMIBI: HCPCS | Performed by: NUCLEAR MEDICINE

## 2020-01-31 PROCEDURE — 2709999900 HC NON-CHARGEABLE SUPPLY

## 2020-01-31 PROCEDURE — 6360000002 HC RX W HCPCS

## 2020-01-31 PROCEDURE — 78452 HT MUSCLE IMAGE SPECT MULT: CPT

## 2020-01-31 PROCEDURE — 93306 TTE W/DOPPLER COMPLETE: CPT

## 2020-01-31 PROCEDURE — 3430000000 HC RX DIAGNOSTIC RADIOPHARMACEUTICAL: Performed by: NUCLEAR MEDICINE

## 2020-01-31 PROCEDURE — 93017 CV STRESS TEST TRACING ONLY: CPT | Performed by: NUCLEAR MEDICINE

## 2020-01-31 RX ADMIN — Medication 9.5 MILLICURIE: at 08:45

## 2020-01-31 RX ADMIN — Medication 33.3 MILLICURIE: at 09:40

## 2020-02-04 ENCOUNTER — TELEPHONE (OUTPATIENT)
Dept: CARDIOLOGY CLINIC | Age: 75
End: 2020-02-04

## 2020-02-04 NOTE — TELEPHONE ENCOUNTER
PT CALLED ASKING FOR ECHO RESULTS:     Summary   Ejection fraction is visually estimated at 55%. Overall left ventricular function is normal.   Myxomatotic degeneration of mitral valve. Calcification of the mitral valve noted. Trace mitral regurgitation is present.     PLEASE ADVISE

## 2020-02-26 ENCOUNTER — HOSPITAL ENCOUNTER (OUTPATIENT)
Dept: GENERAL RADIOLOGY | Age: 75
Discharge: HOME | End: 2020-02-26
Payer: MEDICARE

## 2020-02-26 PROCEDURE — 74246 X-RAY XM UPR GI TRC 2CNTRST: CPT

## 2020-02-26 PROCEDURE — A4641 RADIOPHARM DX AGENT NOC: HCPCS | Performed by: FAMILY MEDICINE

## 2020-02-26 PROCEDURE — 6360000004 HC RX CONTRAST MEDICATION: Performed by: FAMILY MEDICINE

## 2020-02-26 PROCEDURE — 6370000000 HC RX 637 (ALT 250 FOR IP): Performed by: FAMILY MEDICINE

## 2020-02-26 PROCEDURE — 2500000003 HC RX 250 WO HCPCS: Performed by: FAMILY MEDICINE

## 2020-02-26 RX ADMIN — ANTACID/ANTIFLATULENT 1 EACH: 380; 550; 10; 10 GRANULE, EFFERVESCENT ORAL at 09:07

## 2020-02-26 RX ADMIN — BARIUM SULFATE 100 ML: 0.6 SUSPENSION ORAL at 09:07

## 2020-02-26 RX ADMIN — BARIUM SULFATE 140 ML: 980 POWDER, FOR SUSPENSION ORAL at 09:07

## 2020-09-21 ENCOUNTER — OFFICE VISIT (OUTPATIENT)
Dept: CARDIOLOGY CLINIC | Age: 75
End: 2020-09-21
Payer: MEDICARE

## 2020-09-21 VITALS
BODY MASS INDEX: 36.12 KG/M2 | DIASTOLIC BLOOD PRESSURE: 76 MMHG | HEIGHT: 62 IN | HEART RATE: 80 BPM | SYSTOLIC BLOOD PRESSURE: 136 MMHG | WEIGHT: 196.3 LBS

## 2020-09-21 PROCEDURE — 1123F ACP DISCUSS/DSCN MKR DOCD: CPT | Performed by: NUCLEAR MEDICINE

## 2020-09-21 PROCEDURE — 1090F PRES/ABSN URINE INCON ASSESS: CPT | Performed by: NUCLEAR MEDICINE

## 2020-09-21 PROCEDURE — G8427 DOCREV CUR MEDS BY ELIG CLIN: HCPCS | Performed by: NUCLEAR MEDICINE

## 2020-09-21 PROCEDURE — 3017F COLORECTAL CA SCREEN DOC REV: CPT | Performed by: NUCLEAR MEDICINE

## 2020-09-21 PROCEDURE — G8417 CALC BMI ABV UP PARAM F/U: HCPCS | Performed by: NUCLEAR MEDICINE

## 2020-09-21 PROCEDURE — 99213 OFFICE O/P EST LOW 20 MIN: CPT | Performed by: NUCLEAR MEDICINE

## 2020-09-21 PROCEDURE — G8400 PT W/DXA NO RESULTS DOC: HCPCS | Performed by: NUCLEAR MEDICINE

## 2020-09-21 PROCEDURE — 4040F PNEUMOC VAC/ADMIN/RCVD: CPT | Performed by: NUCLEAR MEDICINE

## 2020-09-21 PROCEDURE — 1036F TOBACCO NON-USER: CPT | Performed by: NUCLEAR MEDICINE

## 2020-09-21 NOTE — PROGRESS NOTES
100 [facility],[address]  [address] [place] [id number]  Dept: [phone]  Dept Fax: [phone]  Loc: [phone]    Visit Date: 2020    Ned Sharif is a 75 y.o. female who presents todayfor:  Chief Complaint   Patient presents with    1 Year Follow Up    Coronary Artery Disease    Hypertension    Hyperlipidemia     Known CAD  CABG   No chest pain   No changes in breathing  BP is stable   No dizziness  No syncope  Issues with statins before   As well Repatha !!      HPI:  HPI  Past Medical History:   Diagnosis Date    Arthritis     Asthma     CAD (coronary artery disease)     Deep vein thrombosis (DVT) (HCC)     Escherichia coli infection     H/O coagulation disorder 2014    HLD (hyperlipidemia)     HTN (hypertension)     Hx of blood clots     Joint pain     Morbid obesity ([address]     NSTEMI (non-ST elevated myocardial infarction) ([facility] 2014    S/P cardiac catheterization: 2014: 99% distal RCA, 90% mid-LAD, 85% ostial diagonal, 60% LCx. 2014: 99% distal RCA, 90% mid-LAD, 85% ostial diagonal, 60% LCx. Dr. Carreno Expose Stroke [facility])     Swelling     Wheezing       Past Surgical History:   Procedure Laterality Date    ANKLE SURGERY      CARDIOVASCULAR STRESS TEST  2010    No obvious stress induced ischemia. EF 71%.  CORONARY ARTERY BYPASS GRAFT  6-3-14     x4    DIAGNOSTIC CARDIAC CATH LAB PROCEDURE      EYE SURGERY      HYSTERECTOMY      TRANSTHORACIC ECHOCARDIOGRAM  2010    Normal LV size and systolic function. EF 55-65%. Mild TR.       Family History   Problem Relation Age of Onset    Heart Disease Mother     Diabetes Mother     Heart Disease Father      Social History     Tobacco Use    Smoking status: Former Smoker     Packs/day: 0.20     Years: 24.00     Pack years: 4.80     Types: Cigarettes     Last attempt to quit: 1982     Years since quittin.2  Smokeless tobacco: Never Used   Substance Use Topics    Alcohol use: No      Current Outpatient Medications   Medication Sig Dispense Refill    isosorbide mononitrate (IMDUR) 30 MG extended release tablet Take 1 tablet by mouth daily 30 tablet 11    pantoprazole (PROTONIX) 20 MG tablet Take 2 tablets by mouth daily 30 tablet 0    levothyroxine (SYNTHROID) 50 MCG tablet Take 50 mcg by mouth Daily      metoprolol (LOPRESSOR) 25 MG tablet TAKE ONE-HALF TABLET BY MOUTH TWICE DAILY 90 tablet 1    warfarin (COUMADIN) 2 MG tablet Take 2 mg by mouth daily (Dr Gutierrez Pro follows)      hydrOXYzine (ATARAX) 25 MG tablet Take 25 mg by mouth daily      vitamin B-12 (CYANOCOBALAMIN) 1000 MCG tablet Take 1,000 mcg by mouth daily      Cholecalciferol (VITAMIN D3) 5000 UNITS CAPS Take 1 capsule by mouth daily      Lactobacillus (ACIDOPHILUS) TABS Take 2 tablets by mouth daily Accuflora probiotic acidophilus      amLODIPine (NORVASC) 5 MG tablet Take 5 mg by mouth daily       spironolactone (ALDACTONE) 25 MG tablet Take 25 mg by mouth 2 times daily       albuterol (PROVENTIL) (2.5 MG/3ML) 0.083% nebulizer solution Take 2.5 mg by nebulization 4 times daily as needed.  Coenzyme Q10 (COQ10 PO) Take 100 mg by mouth daily        No current facility-administered medications for this visit.       Allergies   Allergen Reactions    Aspirin     Codeine     Darvocet [Propoxyphene N-Acetaminophen]     Darvon [Propoxyphene Hcl]     Humibid-Dm [Dextromethorphan-Guaifenesin]     Macrobid [Nitrofurantoin Monohydrate Macrocrystals]     Other      CIPRO-HC OTIC    Pcn [Penicillins]     Phenobarbital     Statins     Sulfa Antibiotics     Tessalon [Benzonatate]      Health Maintenance   Topic Date Due    Diabetic foot exam  12[birth year]13/1954    Diabetic retinal exam  12/13/1954    DTaP/Tdap/Td vaccine (1 - Tdap) 12/13/1963    Shingles Vaccine (1 of 2) 12/13/1994    Colon cancer screen colonoscopy  12/13/1994    DEXA (modify frequency per FRAX score)  12/13/1999    Pneumococcal 65+ years Vaccine (1 of 1 - PPSV23) 12/13/2009    Annual Wellness Visit (AWV)  06/23/2019    A1C test (Diabetic or Prediabetic)  05/28/2020    Lipid screen  05/28/2020    Flu vaccine (1) 09/01/2020    Potassium monitoring  01/21/2021    Creatinine monitoring  01/21/2021    Hepatitis A vaccine  Aged Out    Hepatitis B vaccine  Aged Out    Hib vaccine  Aged Out    Meningococcal (ACWY) vaccine  Aged Out       Subjective:  Review of Systems  General:   No fever, no chills, No fatigue or weight loss  Pulmonary:    some dyspnea, no wheezing  Cardiac:    Denies recent chest pain,   GI:     No nausea or vomiting, no abdominal pain  Neuro:    No dizziness or light headedness,   Musculoskeletal:  No recent active issues  Extremities:   No edema, no obvious claudication       Objective:  Physical Exam  /76   Pulse 80   Ht 5' 2\" (1.575 m)   Wt 196 lb 4.8 oz (89 kg)   BMI 35.90 kg/m²   General:   Well developed, well nourished  Lungs:   Clear to auscultation  Heart:    Normal S1 S2, Slight murmur. no rubs, no gallops  Abdomen:   Soft, non tender, no organomegalies, positive bowel sounds  Extremities:   No edema, no cyanosis, good peripheral pulses  Neurological:   Awake, alert, oriented. No obvious focal deficits  Musculoskelatal:  No obvious deformities    Assessment:      Diagnosis Orders   1. Coronary artery disease involving coronary bypass graft of native heart without angina pectoris     2. Essential hypertension     3. Familial hypercholesterolemia     cardiac fair for now     Plan:  No follow-ups on file. As above  Continue risk factor modification and medical management  Thank you for allowing me to participate in the care of your patient. Please don't hesitate to contact me regarding any further issues related to the patient care    Orders Placed:  No orders of the defined types were placed in this encounter.       Medications Prescribed:  No orders of the defined types were placed in this encounter. Discussed use, benefit, and side effects of prescribed medications. All patient questions answered. Pt voicedunderstanding. Instructed to continue current medications, diet and exercise. Continue risk factor modification and medical management. Patient agreed with treatment plan. Follow up as directed.     Electronically signedby Servando Michel MD on 9/21/2020 at 11:49 AM

## 2021-03-09 ENCOUNTER — CLINICAL DOCUMENTATION (OUTPATIENT)
Dept: SPIRITUAL SERVICES | Facility: CLINIC | Age: 76
End: 2021-03-09

## 2021-03-09 NOTE — ACP (ADVANCE CARE PLANNING)
Advance Care Planning     General Advance Care Planning (ACP) Conversation    Date of Conversation: 3/9/2021  Conducted with: Patient with [address] Decision Maker:    Primary Decision Maker: Tracie Lefort - Spouse - [phone]    Secondary Decision Maker: Hans Main - Child - [phone]    Supplemental (Other) Decision Maker: Magali Almazan - Child - [phone]    Content/Action Overview: Has ACP document(s) on file - reflects the patient's care preferences   received a telephone call from daughter, [employer], to offer support in the review of patient's completed and notarized Advance Directives documents prior to distribution and scanning into their file.   * Downloaded Advance Directives documents which had been emailed for review  * Reviewed documents with [employer] via telephone call for patient's wishes and proper execution of document completion  * Faxed Advance Directives documents to Medical Records to be scanned into patient's file  * Updated [address] contact information to reflect wishes expressed in documents    Length of Voluntary ACP Conversation in minutes:  1 hour    Jakub Duty

## 2021-09-20 ENCOUNTER — OFFICE VISIT (OUTPATIENT)
Dept: CARDIOLOGY CLINIC | Age: 76
End: 2021-09-20
Payer: MEDICARE

## 2021-09-20 VITALS
HEIGHT: 62 IN | DIASTOLIC BLOOD PRESSURE: 64 MMHG | WEIGHT: 187.2 LBS | HEART RATE: 54 BPM | SYSTOLIC BLOOD PRESSURE: 118 MMHG | BODY MASS INDEX: 34.45 KG/M2

## 2021-09-20 DIAGNOSIS — I10 ESSENTIAL HYPERTENSION: ICD-10-CM

## 2021-09-20 DIAGNOSIS — I25.810 CORONARY ARTERY DISEASE INVOLVING CORONARY BYPASS GRAFT OF NATIVE HEART WITHOUT ANGINA PECTORIS: Primary | ICD-10-CM

## 2021-09-20 DIAGNOSIS — E78.01 FAMILIAL HYPERCHOLESTEROLEMIA: ICD-10-CM

## 2021-09-20 PROCEDURE — 93000 ELECTROCARDIOGRAM COMPLETE: CPT | Performed by: NUCLEAR MEDICINE

## 2021-09-20 PROCEDURE — 99213 OFFICE O/P EST LOW 20 MIN: CPT | Performed by: NUCLEAR MEDICINE

## 2021-09-20 NOTE — PROGRESS NOTES
[address] [facility] [address]  [address]  [facility] [address]  Dept: [phone]  Dept Fax: [phone]  Loc: [phone]    Visit Date: 2021    Karyle Hansen is a 67 y.o. female who presents todayfor:  Chief Complaint   Patient presents with    1 Year Follow Up    Coronary Artery Disease    Hypertension    Hyperlipidemia   know CAD and CABG    No chest pain   Issues with all statins and Repatha  Bp is stable  No dizziness  No syncope  No new symptoms        HPI:  HPI  Past Medical History:   Diagnosis Date    Arthritis     Asthma     CAD (coronary artery disease)     Deep vein thrombosis (DVT) (HCC)     Escherichia coli infection     H/O coagulation disorder 2014    HLD (hyperlipidemia)     HTN (hypertension)     Hx of blood clots     Joint pain     Morbid obesity ([address]     NSTEMI (non-ST elevated myocardial infarction) ([facility] 2014    S/P cardiac catheterization: 2014: 99% distal RCA, 90% mid-LAD, 85% ostial diagonal, 60% LCx. 2014: 99% distal RCA, 90% mid-LAD, 85% ostial diagonal, 60% LCx. Dr. Addie Matson Stroke [facility])     Swelling     Wheezing       Past Surgical History:   Procedure Laterality Date    ANKLE SURGERY      CARDIOVASCULAR STRESS TEST  2010    No obvious stress induced ischemia. EF 71%.  CORONARY ARTERY BYPASS GRAFT  6-3-14     x4    DIAGNOSTIC CARDIAC CATH LAB PROCEDURE      EYE SURGERY      HYSTERECTOMY      TRANSTHORACIC ECHOCARDIOGRAM  2010    Normal LV size and systolic function. EF 55-65%. Mild TR.       Family History   Problem Relation Age of Onset    Heart Disease Mother     Diabetes Mother     Heart Disease Father      Social History     Tobacco Use    Smoking status: Former Smoker     Packs/day: 0.20     Years: 24.00     Pack years: 4.80     Types: Cigarettes     Quit date: 1982     Years since quittin.2    Smokeless tobacco: of 1 - PPSV23) Never done   [employer] Visit (AWV)  Never done    Potassium monitoring  01/21/2021    Creatinine monitoring  01/21/2021    Flu vaccine (1) 09/01/2021    Hepatitis A vaccine  Aged Out    Hepatitis B vaccine  Aged Out    Hib vaccine  Aged Out    Meningococcal (ACWY) vaccine  Aged Out       Subjective:  Review of Systems  General:   No fever, no chills, No fatigue or weight loss  Pulmonary:    No dyspnea, no wheezing  Cardiac:    Denies recent chest pain,   GI:     No nausea or vomiting, no abdominal pain  Neuro:    No dizziness or light headedness,   Musculoskeletal:  No recent active issues  Extremities:   No edema, no obvious claudication       Objective:  Physical Exam  /64   Pulse 54   Ht 5' 2\" (1.575 m)   Wt 187 lb 3.2 oz (84.9 kg)   BMI 34.24 kg/m²   General:   Well developed, well nourished  Lungs:   Clear to auscultation  Heart:    Normal S1 S2, Slight murmur. no rubs, no gallops  Abdomen:   Soft, non tender, no organomegalies, positive bowel sounds  Extremities:   No edema, no cyanosis, good peripheral pulses  Neurological:   Awake, alert, oriented. No obvious focal deficits  Musculoskelatal:  No obvious deformities    Assessment:      Diagnosis Orders   1. Coronary artery disease involving coronary bypass graft of native heart without angina pectoris  EKG 12 lead   2. Essential hypertension  EKG 12 lead   3. Familial hypercholesterolemia     as above  Cardiac fair for now   ECG in office was done today. I reviewed the ECG. No acute findings    Plan:  No follow-ups on file. As above  Continue risk factor modification and medical management  Thank you for allowing me to participate in the care of your patient. Please don't hesitate to contact me regarding any further issues related to the patient care    Orders Placed:  Orders Placed This Encounter   Procedures    EKG 12 lead     Order Specific Question:   Reason for Exam?     Answer:    Other       Medications Prescribed:  No orders of the defined types were placed in this encounter. Discussed use, benefit, and side effects of prescribed medications. All patient questions answered. Pt voicedunderstanding. Instructed to continue current medications, diet and exercise. Continue risk factor modification and medical management. Patient agreed with treatment plan. Follow up as directed.     Electronically signedby Hayes Freedman MD on 9/20/2021 at 12:09 PM

## 2022-09-26 ENCOUNTER — OFFICE VISIT (OUTPATIENT)
Dept: CARDIOLOGY CLINIC | Age: 77
End: 2022-09-26
Payer: COMMERCIAL

## 2022-09-26 VITALS
HEART RATE: 65 BPM | HEIGHT: 62 IN | DIASTOLIC BLOOD PRESSURE: 66 MMHG | BODY MASS INDEX: 35.96 KG/M2 | WEIGHT: 195.4 LBS | SYSTOLIC BLOOD PRESSURE: 128 MMHG

## 2022-09-26 DIAGNOSIS — I10 PRIMARY HYPERTENSION: ICD-10-CM

## 2022-09-26 DIAGNOSIS — I25.810 CORONARY ARTERY DISEASE INVOLVING CORONARY BYPASS GRAFT OF NATIVE HEART WITHOUT ANGINA PECTORIS: Primary | ICD-10-CM

## 2022-09-26 DIAGNOSIS — E78.01 FAMILIAL HYPERCHOLESTEROLEMIA: ICD-10-CM

## 2022-09-26 PROCEDURE — 93000 ELECTROCARDIOGRAM COMPLETE: CPT | Performed by: NUCLEAR MEDICINE

## 2022-09-26 PROCEDURE — 99213 OFFICE O/P EST LOW 20 MIN: CPT | Performed by: NUCLEAR MEDICINE

## 2022-09-26 PROCEDURE — 1123F ACP DISCUSS/DSCN MKR DOCD: CPT | Performed by: NUCLEAR MEDICINE

## 2022-09-26 NOTE — PROGRESS NOTES
[id number] [facility] [address]  [address]  [facility] [address]  Dept: [phone]  Dept Fax: [phone]  Loc: [phone]    Visit Date: 2022    Keren Berry is a 67 y.o. female who presents todayfor:  Chief Complaint   Patient presents with    Check-Up    Coronary Artery Disease    Hypertension    Hyperlipidemia   Know CABG   Done   No chest pain  No changes in breathing  BP is stable  No dizziness  Issues with statins   Refused them before         HPI:  HPI  Past Medical History:   Diagnosis Date    Arthritis     Asthma     CAD (coronary artery disease)     Deep vein thrombosis (DVT) (HCC)     Escherichia coli infection     H/O coagulation disorder 2014    HLD (hyperlipidemia)     HTN (hypertension)     Hx of blood clots     Joint pain     Morbid obesity (HCC)     NSTEMI (non-ST elevated myocardial infarction) ([facility] [address] 2014    S/P cardiac catheterization: 2014: 99% distal RCA, 90% mid-LAD, 85% ostial diagonal, 60% LCx. 2014: 99% distal RCA, 90% mid-LAD, 85% ostial diagonal, 60% LCx. Dr. Toshia Ingram    Stroke [facility])     Swelling     Wheezing       Past Surgical History:   Procedure Laterality Date    ANKLE SURGERY      CARDIOVASCULAR STRESS TEST  2010    No obvious stress induced ischemia. EF 71%. CORONARY ARTERY BYPASS GRAFT  6-3-14     x4    DIAGNOSTIC CARDIAC CATH LAB PROCEDURE      EYE SURGERY      HYSTERECTOMY (CERVIX STATUS UNKNOWN)      TRANSTHORACIC ECHOCARDIOGRAM  2010    Normal LV size and systolic function. EF 55-65%. Mild TR.       Family History   Problem Relation Age of Onset    Heart Disease Mother     Diabetes Mother     Heart Disease Father      Social History     Tobacco Use    Smoking status: Former     Packs/day: 0.20     Years: 24.00     Pack years: 4.80     Types: Cigarettes     Quit date: 1982     Years since quittin.2    Smokeless tobacco: Never   Substance Use Topics    Alcohol use: No      Current Outpatient Medications   Medication Sig Dispense Refill    isosorbide mononitrate (IMDUR) 30 MG extended release tablet Take 1 tablet by mouth daily 30 tablet 11    pantoprazole (PROTONIX) 20 MG tablet Take 2 tablets by mouth daily 30 tablet 0    levothyroxine (SYNTHROID) 50 MCG tablet Take 50 mcg by mouth Daily      metoprolol (LOPRESSOR) 25 MG tablet TAKE ONE-HALF TABLET BY MOUTH TWICE DAILY 90 tablet 1    warfarin (COUMADIN) 2 MG tablet Take 2 mg by mouth daily (Dr Chen Rough follows)      hydrOXYzine (ATARAX) 25 MG tablet Take 25 mg by mouth daily      vitamin B-12 (CYANOCOBALAMIN) 1000 MCG tablet Take 1,000 mcg by mouth daily      Cholecalciferol (VITAMIN D3) 5000 UNITS CAPS Take 1 capsule by mouth daily      Lactobacillus (ACIDOPHILUS) TABS Take 2 tablets by mouth daily Accuflora probiotic acidophilus      amLODIPine (NORVASC) 5 MG tablet Take 5 mg by mouth daily       spironolactone (ALDACTONE) 25 MG tablet Take 25 mg by mouth 2 times daily       albuterol (PROVENTIL) (2.5 MG/3ML) 0.083% nebulizer solution Take 2.5 mg by nebulization 4 times daily as needed. Coenzyme Q10 (COQ10 PO) Take 100 mg by mouth daily        No current facility-administered medications for this visit.      Allergies   Allergen Reactions    Aspirin     Codeine     Darvocet [Propoxyphene N-Acetaminophen]     Darvon [Propoxyphene Hcl]     Humibid-Dm [Dextromethorphan-Guaifenesin]     Macrobid [Nitrofurantoin Monohydrate Macrocrystals]     Other      CIPRO-HC OTIC    Pcn [Penicillins]     Phenobarbital     Statins     Sulfa Antibiotics     Tessalon [Benzonatate]      Health Maintenance   Topic Date Due    COVID-19 Vaccine (1) Never done    Pneumococcal 65+ years Vaccine (1 - PCV) Never done    Depression Screen  Never done    Hepatitis C screen  Never done    DTaP/Tdap/Td vaccine (1 - Tdap) Never done    Shingles vaccine (1 of 2) Never done    DEXA (modify frequency per FRAX score)  Never done Annual Wellness Visit (AWV)  Never done    Flu vaccine (1) 08/01/2022    Hepatitis A vaccine  Aged Out    Hepatitis B vaccine  Aged Out    Hib vaccine  Aged Out    Meningococcal (ACWY) vaccine  Aged Out       Subjective:  Review of Systems  General:   No fever, no chills, some fatigue or weight loss  Pulmonary:    some dyspnea, no wheezing  Cardiac:    Denies recent chest pain,   GI:     No nausea or vomiting, no abdominal pain  Neuro:    No dizziness or light headedness,   Musculoskeletal:  No recent active issues  Extremities:   No edema, no obvious claudication     Objective:  Physical Exam  /66   Pulse 65   Ht 5' 2\" (1.575 m)   Wt 195 lb 6.4 oz (88.6 kg)   BMI 35.74 kg/m²   General:   Well developed, well nourished  Lungs:   Clear to auscultation  Heart:    Normal S1 S2, Slight murmur. no rubs, no gallops  Abdomen:   Soft, non tender, no organomegalies, positive bowel sounds  Extremities:   No edema, no cyanosis, good peripheral pulses  Neurological:   Awake, alert, oriented. No obvious focal deficits  Musculoskelatal:  No obvious deformities    Assessment:      Diagnosis Orders   1. Coronary artery disease involving coronary bypass graft of native heart without angina pectoris  EKG 12 Lead      2. Primary hypertension        3. Familial hypercholesterolemia        As above  Cardiac fair for now   ECG in office was done today. I reviewed the ECG. No acute findings    Plan:  No follow-ups on file. As above  Continue risk factor modification and medical management  Thank you for allowing me to participate in the care of your patient. Please don't hesitate to contact me regarding any further issues related to the patient care    Orders Placed:  Orders Placed This Encounter   Procedures    EKG 12 Lead     Order Specific Question:   Reason for Exam?     Answer: Other       Medications Prescribed:  No orders of the defined types were placed in this encounter.          Discussed use, benefit, and side effects of prescribed medications. All patient questions answered. Pt voicedunderstanding. Instructed to continue current medications, diet and exercise. Continue risk factor modification and medical management. Patient agreed with treatment plan. Follow up as directed.     Electronically signedby Boyd Rebollar MD on 9/26/2022 at 11:38 AM

## 2022-09-26 NOTE — PROGRESS NOTES
Patient here for check up. EKG done today. Patient didn't bring medication list or bottles. Patient states nothing has changed. Patient denies any cardiac symptoms.

## 2022-10-08 ENCOUNTER — HOSPITAL ENCOUNTER (EMERGENCY)
Age: 77
Discharge: HOME | End: 2022-10-08
Attending: EMERGENCY MEDICINE
Payer: COMMERCIAL

## 2022-10-08 ENCOUNTER — APPOINTMENT (OUTPATIENT)
Dept: GENERAL RADIOLOGY | Age: 77
End: 2022-10-08
Payer: COMMERCIAL

## 2022-10-08 VITALS
HEART RATE: 73 BPM | DIASTOLIC BLOOD PRESSURE: 73 MMHG | SYSTOLIC BLOOD PRESSURE: 166 MMHG | RESPIRATION RATE: 20 BRPM | OXYGEN SATURATION: 96 %

## 2022-10-08 DIAGNOSIS — M54.2 NECK PAIN: ICD-10-CM

## 2022-10-08 DIAGNOSIS — M25.511 ACUTE PAIN OF RIGHT SHOULDER: ICD-10-CM

## 2022-10-08 DIAGNOSIS — S46.811A STRAIN OF RIGHT TRAPEZIUS MUSCLE, INITIAL ENCOUNTER: Primary | ICD-10-CM

## 2022-10-08 LAB
CHP ED QC CHECK: YES
GLUCOSE BLD-MCNC: 105 MG/DL (ref 70–108)
GLUCOSE BLD-MCNC: 107 MG/DL

## 2022-10-08 PROCEDURE — 82948 REAGENT STRIP/BLOOD GLUCOSE: CPT

## 2022-10-08 PROCEDURE — 72040 X-RAY EXAM NECK SPINE 2-3 VW: CPT

## 2022-10-08 PROCEDURE — 99283 EMERGENCY DEPT VISIT LOW MDM: CPT

## 2022-10-08 PROCEDURE — 6370000000 HC RX 637 (ALT 250 FOR IP): Performed by: STUDENT IN AN ORGANIZED HEALTH CARE EDUCATION/TRAINING PROGRAM

## 2022-10-08 PROCEDURE — 73030 X-RAY EXAM OF SHOULDER: CPT

## 2022-10-08 RX ORDER — TRAMADOL HYDROCHLORIDE 50 MG/1
50 TABLET ORAL EVERY 6 HOURS PRN
Qty: 12 TABLET | Refills: 0 | Status: SHIPPED | OUTPATIENT
Start: 2022-10-08 | End: 2022-10-11

## 2022-10-08 RX ORDER — TRAMADOL HYDROCHLORIDE 50 MG/1
100 TABLET ORAL ONCE
Status: COMPLETED | OUTPATIENT
Start: 2022-10-08 | End: 2022-10-08

## 2022-10-08 RX ORDER — PREDNISONE 20 MG/1
20 TABLET ORAL DAILY
Qty: 5 TABLET | Refills: 0 | Status: SHIPPED | OUTPATIENT
Start: 2022-10-08 | End: 2022-10-13

## 2022-10-08 RX ORDER — CYCLOBENZAPRINE HCL 10 MG
10 TABLET ORAL 3 TIMES DAILY PRN
Qty: 30 TABLET | Refills: 0 | Status: SHIPPED | OUTPATIENT
Start: 2022-10-08 | End: 2022-10-18

## 2022-10-08 RX ADMIN — TRAMADOL HYDROCHLORIDE 100 MG: 50 TABLET, COATED ORAL at 14:30

## 2022-10-08 ASSESSMENT — ENCOUNTER SYMPTOMS
ABDOMINAL PAIN: 0
VOMITING: 0
BLOOD IN STOOL: 0
NAUSEA: 0
SHORTNESS OF BREATH: 0

## 2022-10-08 ASSESSMENT — PAIN DESCRIPTION - ORIENTATION: ORIENTATION: RIGHT

## 2022-10-08 ASSESSMENT — PAIN - FUNCTIONAL ASSESSMENT: PAIN_FUNCTIONAL_ASSESSMENT: 0-10

## 2022-10-08 ASSESSMENT — PAIN SCALES - GENERAL
PAINLEVEL_OUTOF10: 8
PAINLEVEL_OUTOF10: 10

## 2022-10-08 ASSESSMENT — PAIN DESCRIPTION - LOCATION: LOCATION: SHOULDER

## 2022-10-08 NOTE — ED PROVIDER NOTES
[address] [facility]\"" [address] [address] EMERGENCY DEPT  Emergency Department  Attending Physician Attestation       Patient was seen by  ER/IM Resident Dr. Husam Zayas and I supervised/co-managed the case    I performed a history and physical examination of the patient and discussed management with the Resident/Trainee. I reviewed the Resident's note and agree with the documented findings and plan of care. Any areas of disagreement are noted on the chart. I was personally present for the key portions of any procedures. I have documented in the chart those procedures where I was not present during the key portions. I have reviewed the emergency nurses triage note. I agree with the chief complaint, past medical history, past surgical history, allergies, medications, social and family history as documented unless otherwise noted below. For Physician Assistant/ Nurse Practitioner cases I have personally evaluated this patient and have completed at least one if not all key elements of the E/M (history, physical exam, and MDM). Additional findings are as noted. Chief Complaint      Nena Mac is a 67 y.o. female who presented to the emergency room due to pain in the right shoulder started a week ago. The patient pain started when patient was doing lawnmowing with a Zero turning radius mower when he tried to get around to tree and a limb of the tree almost hit her and patient jerked her neck. Patient having pain on the right suprascapular area and neck since then.   Denies any shoulder pain denies any shortness of breath or chest pain or nausea or vomiting      System Problem List     Patient Active Problem List   Diagnosis    CAD (coronary artery disease)    HTN (hypertension)    HLD (hyperlipidemia)    Deep vein thrombosis (DVT) ([facility])    Asthma    Wheezing    Stroke ([facility])    Swelling    Joint pain    Morbid obesity ([facility])    Escherichia coli infection    NSTEMI (non-ST elevated myocardial infarction) ([facility])    S/P cardiac catheterization: 5/30/2014: 99% distal RCA, 90% mid-LAD, 85% ostial diagonal, 60% LCx. H/O coagulation disorder    Diverticulitis    SIRS (systemic inflammatory response syndrome) (HCC)       Pertinent Physical Exam:    INITIAL VITALS: BP (!) 166/73   Pulse 73   Resp 20   SpO2 96%  Estimated body mass index is 35.74 kg/m² as calculated from the following:    Height as of 9/26/22: 5' 2\" (1.575 m). Weight as of 9/26/22: 195 lb 6.4 oz (88.6 kg). Physical Exam  Vitals reviewed. Constitutional:       Appearance: She is well-developed. HENT:      Head: Normocephalic and atraumatic. Right Ear: External ear normal.      Left Ear: External ear normal.      Nose: Nose normal.   Eyes:      General: No scleral icterus. Conjunctiva/sclera: Conjunctivae normal.      Pupils: Pupils are equal, round, and reactive to light. Neck:      Thyroid: No thyromegaly. Vascular: No JVD. Cardiovascular:      Rate and Rhythm: Normal rate and regular rhythm. Heart sounds: No murmur heard. No friction rub. Pulmonary:      Effort: Pulmonary effort is normal.      Breath sounds: Normal breath sounds. No wheezing or rales. Chest:      Chest wall: No tenderness. Abdominal:      General: Bowel sounds are normal.      Palpations: Abdomen is soft. There is no mass. Tenderness: There is no abdominal tenderness. Musculoskeletal:      Cervical back: Normal range of motion and neck supple. Tenderness present. Lymphadenopathy:      Cervical: No cervical adenopathy. Skin:     Findings: No rash. Neurological:      Mental Status: She is alert and oriented to person, place, and time. Psychiatric:         Behavior: Behavior is cooperative. DIAGNOSTIC RESULTS     RADIOLOGY:   XR SHOULDER RIGHT (MIN 2 VIEWS)   Final Result      No fracture or dislocation.       Final report electronically signed by Dr. Myles Mars on 10/8/2022 2:32 PM      XR CERVICAL SPINE (2-3 VIEWS)   Final Result      No fracture or spondylolisthesis of the cervical spine. Final report electronically signed by Dr. Lilia Paiz on 10/8/2022 2:31 PM            LABS:  Labs Reviewed   POCT GLUCOSE - Normal   POCT GLUCOSE         EMERGENCY DEPARTMENT COURSE:     Vitals:    Vitals:    10/08/22 1315 10/08/22 1317   BP:  (!) 166/73   Pulse: 73    Resp: 20    SpO2: 96%        Medications   traMADol (ULTRAM) tablet 100 mg (100 mg Oral Given 10/8/22 1430)       The pt was seen and evaluated by me. Within the department, I observed the pt's vital signs to be within acceptable range. Radiological studies were performed, results were reviewed with the patient. Within the department, the pt was treated with Ultram. I observed the pt's condition to being hemodynamically stable and her pain is getting better during the duration of their stay. I explained my proposed course of treatment to the pt, and they were amenable to my decision. They were discharged home, and they will return to the ED if their symptoms become more severein nature, or otherwise change. Medical Decision-Making:       FINAL IMPRESSION       1. Strain of right trapezius muscle, initial encounter    2. Acute pain of right shoulder    3. Neck pain            DISPOSITION/PLAN  PATIENT REFERRED TO:  Hermann Quevedo MD  [address] [place] [address]  [phone]    In 1 week  If symptoms worsen  DISCHARGE MEDICATIONS:  Discharge Medication List as of 10/8/2022  3:21 PM        START taking these medications    Details   cyclobenzaprine (FLEXERIL) 10 MG tablet Take 1 tablet by mouth 3 times daily as needed for Muscle spasms Caution: may cause drowsiness, Disp-30 tablet, R-0Normal      predniSONE (DELTASONE) 20 MG tablet Take 1 tablet by mouth daily for 5 days, Disp-5 tablet, R-0Normal      traMADol (ULTRAM) 50 MG tablet Take 1 tablet by mouth every 6 hours as needed for Pain for up to 3 days. Intended supply: 3 days.  Take lowest dose possible to manage pain, Disp-12 tablet, R-0Normal               (Please note that portions of this note were completed with a voice recognition program and electronically transcribed. Efforts were [facility] edit the dictations but occasionally words are mis-transcribed . The transcription may contain errors not detected in proofreading.   This transcription was electronically signed.)      Valentino Rumple, MD    Attending Emergency Physician         Valentino Rumple, MD  10/09/22 93

## 2022-10-08 NOTE — DISCHARGE INSTRUCTIONS
Use the tramadol for pain as sparingly as possible  Flexeril may cause drowsiness, use with caution   Increase rest and allow musculature time to heal

## 2022-10-08 NOTE — ED PROVIDER NOTES
[place] ENCOUNTER          Pt Name: Enma Robles  MRN: [id number]  [place] 1945  Date of evaluation: 10/8/2022  Treating Resident Physician: Jono Jerez DO  Supervising Physician: Dr. Jovanni Garrison    History obtained from the patient. CHIEF COMPLAINT       Chief Complaint   Patient presents with    Shoulder Injury           HISTORY OF PRESENT ILLNESS    . Enma Robles is a 67 y.o. female who presents to the emergency department for evaluation of right shoulder pain. Hurt right shoulder last Sunday while mowing the lawn, jerked her neck to avoid a tree branch and felt right shoulder pain. Has been getting worse since that date, has been using biofreeze and ice packs. Initially had been improving, but got a lot worse last night. Heating packs help some, but pain comes back severe following heating pads. PCP sent in flexeril, took flexeril last night and this morning in addition to tylenol with no relief. Rates pain currently as higher than a 10/10. Pain does not radiate down upper extremity, denies any numbness or tingling in upper extremity. The patient has no other acute complaints at this time. REVIEW OF SYSTEMS   Review of Systems   Constitutional:  Negative for chills and fever. Respiratory:  Negative for shortness of breath. Cardiovascular:  Negative for chest pain. Gastrointestinal:  Negative for abdominal pain, blood in stool, nausea and vomiting. Genitourinary:  Negative for hematuria. Musculoskeletal:  Positive for arthralgias, joint swelling (right shoulder), myalgias, neck pain and neck stiffness. Skin:  Negative for rash. Neurological:  Negative for dizziness, weakness, light-headedness and numbness.        PAST MEDICAL AND SURGICAL HISTORY     Past Medical History:   Diagnosis Date    Arthritis     Asthma     CAD (coronary artery disease)     Deep vein thrombosis (DVT) ([facility])     Escherichia coli infection     H/O coagulation disorder 5/30/2014    HLD (hyperlipidemia)     HTN (hypertension)     Hx of blood clots     Joint pain     Morbid obesity ([facility] [address]     NSTEMI (non-ST elevated myocardial infarction) ([facility] [address] 5/30/2014    S/P cardiac catheterization: 5/30/2014: 99% distal RCA, 90% mid-LAD, 85% ostial diagonal, 60% LCx. 5/30/2014 5/30/2014: 99% distal RCA, 90% mid-LAD, 85% ostial diagonal, 60% LCx. Dr. Allison Hands    Stroke [facility])     Swelling     Wheezing      Past Surgical History:   Procedure Laterality Date    ANKLE SURGERY  1990    CARDIOVASCULAR STRESS TEST  4 22 2010    No obvious stress induced ischemia. EF 71%. CORONARY ARTERY BYPASS GRAFT  6-3-14     x4    DIAGNOSTIC CARDIAC CATH LAB PROCEDURE      EYE SURGERY      HYSTERECTOMY (CERVIX STATUS UNKNOWN)  1972    TRANSTHORACIC ECHOCARDIOGRAM  4 22 2010    Normal LV size and systolic function. EF 55-65%. Mild TR. MEDICATIONS   No current facility-administered medications for this encounter. Current Outpatient Medications:     cyclobenzaprine (FLEXERIL) 10 MG tablet, Take 1 tablet by mouth 3 times daily as needed for Muscle spasms Caution: may cause drowsiness, Disp: 30 tablet, Rfl: 0    predniSONE (DELTASONE) 20 MG tablet, Take 1 tablet by mouth daily for 5 days, Disp: 5 tablet, Rfl: 0    traMADol (ULTRAM) 50 MG tablet, Take 1 tablet by mouth every 6 hours as needed for Pain for up to 3 days. Intended supply: 3 days.  Take lowest dose possible to manage pain, Disp: 12 tablet, Rfl: 0    isosorbide mononitrate (IMDUR) 30 MG extended release tablet, Take 1 tablet by mouth daily, Disp: 30 tablet, Rfl: 11    pantoprazole (PROTONIX) 20 MG tablet, Take 2 tablets by mouth daily, Disp: 30 tablet, Rfl: 0    levothyroxine (SYNTHROID) 50 MCG tablet, Take 50 mcg by mouth Daily, Disp: , Rfl:     metoprolol (LOPRESSOR) 25 MG tablet, TAKE ONE-HALF TABLET BY MOUTH TWICE DAILY, Disp: 90 tablet, Rfl: 1    warfarin (COUMADIN) 2 MG tablet, Take 2 mg by mouth daily (Dr Christos Posada follows), Disp: , Rfl:     hydrOXYzine (ATARAX) 25 MG tablet, Take 25 mg by mouth daily, Disp: , Rfl:     vitamin B-12 (CYANOCOBALAMIN) 1000 MCG tablet, Take 1,000 mcg by mouth daily, Disp: , Rfl:     Cholecalciferol (VITAMIN D3) 5000 UNITS CAPS, Take 1 capsule by mouth daily, Disp: , Rfl:     Lactobacillus (ACIDOPHILUS) TABS, Take 2 tablets by mouth daily Accuflora probiotic acidophilus, Disp: , Rfl:     amLODIPine (NORVASC) 5 MG tablet, Take 5 mg by mouth daily , Disp: , Rfl:     spironolactone (ALDACTONE) 25 MG tablet, Take 25 mg by mouth 2 times daily , Disp: , Rfl:     albuterol (PROVENTIL) (2.5 MG/3ML) 0.083% nebulizer solution, Take 2.5 mg by nebulization 4 times daily as needed. , Disp: , Rfl:     Coenzyme Q10 (COQ10 PO), Take 100 mg by mouth daily , Disp: , Rfl:       SOCIAL HISTORY     Social History     Social History Narrative    Not on file     Social History     Tobacco Use    Smoking status: Former     Packs/day: 0.20     Years: 24.00     Pack years: 4.80     Types: Cigarettes     Quit date: 1982     Years since quittin.3    Smokeless tobacco: Never   Substance Use Topics    Alcohol use: No    Drug use: No         ALLERGIES     Allergies   Allergen Reactions    Aspirin     Codeine     Darvocet [Propoxyphene N-Acetaminophen]     Darvon [Propoxyphene Hcl]     Humibid-Dm [Dextromethorphan-Guaifenesin]     Macrobid [Nitrofurantoin Monohydrate Macrocrystals]     Other      CIPRO-HC OTIC    Pcn [Penicillins]     Phenobarbital     Statins     Sulfa Antibiotics     Tessalon [Benzonatate]          FAMILY HISTORY     Family History   Problem Relation Age of Onset    Heart Disease Mother     Diabetes Mother     Heart Disease Father          PREVIOUS RECORDS   Previous records reviewed:  Visit reviewed 2020 presenting for atypical chest pain .         PHYSICAL EXAM     ED Triage Vitals   BP Temp Temp src Heart Rate Resp SpO2 Height Weight   10/08/22 1317 -- -- 10/08/22 1315 10/08/22 1315 10/08/22 1315 -- --   (!) 166/73   73 20 96 %       Initial vital signs and nursing assessment reviewed and  noted to by hypertensive with blood pressure 166/73, is in acute pain . There is no height or weight on file to calculate BMI. Pulsoximetry is normal per my interpretation. Additional Vital Signs:  Vitals:    10/08/22 1317   BP: (!) 166/73   Pulse:    Resp:    SpO2:        Physical Exam  Constitutional:       General: She is not in acute distress. Appearance: Normal appearance. HENT:      Head: Normocephalic and atraumatic. Cardiovascular:      Rate and Rhythm: Normal rate and regular rhythm. Heart sounds: Normal heart sounds. No murmur heard. No gallop. Pulmonary:      Effort: Pulmonary effort is normal. No respiratory distress. Breath sounds: Normal breath sounds. No wheezing. Musculoskeletal:         General: Tenderness present. No deformity. Normal range of motion. Comments: Tenderness to palpation in right cervical paraspinal musculature and right side trapezius muscles. No pain the shoulder joint. ROM testing normal of right upper extremity. Strength testing 5/5 bilaterally upper extremities, no sensory deficits. Pain worsened with cervical extension, flexion, rotation right and sidebending left. Hypertonicity of right trapezius and paraspinal musculature noted   Skin:     General: Skin is warm. Neurological:      General: No focal deficit present. Mental Status: She is alert and oriented to person, place, and time. MEDICAL DECISION MAKING   Initial Assessment: This is a 68 y/o female presenting with complaints of right sided shoulder pain for the last week. She denies any weakness, numbness or tingling of the right upper extremity.  Differential diagnosis includes but not limited to acute muscle strain, rotator cuff injury, cervical stenosis,   Plan:   XR right shoulder and cervical spine  Give dose of tramadol for acute pain   Check POCT glucose         ED RESULTS   Laboratory results:  Labs Reviewed   POCT GLUCOSE - Normal   POCT GLUCOSE       Radiologic studies results:  XR SHOULDER RIGHT (MIN 2 VIEWS)   Final Result      No fracture or dislocation. Final report electronically signed by Dr. Сергей Tesfaye on 10/8/2022 2:32 PM      XR CERVICAL SPINE (2-3 VIEWS)   Final Result      No fracture or spondylolisthesis of the cervical spine. Final report electronically signed by Dr. Сергей Tesfaye on 10/8/2022 2:31 PM          ED Medications administered this visit:   Medications   traMADol (ULTRAM) tablet 100 mg (100 mg Oral Given 10/8/22 1430)         ED COURSE     ED Course as of 10/08/22 1530   Sat Oct 08, 2022   1454 XR shoulder and XR cervical spine with no acute abnormalities    [AM]   1454 Will check POCT glucose testing to ensure no hyperglycemia prior to prescribing prednisone    [AM]   1454 Noted some improvement of pain following administration of tramadol in ED  [AM]   1455 POCT glucose 107 [AM]      ED Course User Index  [AM] Neeta Carlson DO       At this time patient stable for discharge with some improvement of pain following administration of tramadol. Counseled to use tramadol as sparingly as possible for break through pain, in addition to use flexeril as needed and that this may cause drowsiness. Prednisone may increase blood sugars, POCT glucose testing in . Patient to follow up with PCP     Strict return precautions and follow up instructions were discussed with the patient prior to discharge, with which the patient agrees.       MEDICATION CHANGES     Discharge Medication List as of 10/8/2022  3:21 PM        START taking these medications    Details   cyclobenzaprine (FLEXERIL) 10 MG tablet Take 1 tablet by mouth 3 times daily as needed for Muscle spasms Caution: may cause drowsiness, Disp-30 tablet, R-0Normal      predniSONE (DELTASONE) 20 MG tablet Take 1 tablet by mouth daily for 5 days, Disp-5 tablet, R-0Normal      traMADol (ULTRAM) 50 MG tablet Take 1 tablet by mouth every 6 hours as needed for Pain for up to 3 days. Intended supply: 3 days. Take lowest dose possible to manage pain, Disp-12 tablet, R-0Normal               FINAL DISPOSITION     Final diagnoses:   Acute pain of right shoulder   Neck pain   Strain of right trapezius muscle, initial encounter     Condition: condition: stable  Dispo: Discharge to home      This transcription was electronically signed. Parts of this transcriptions may have been dictated by use of voice recognition software and electronically transcribed, and parts may have been transcribed with the assistance of an ED scribe. The transcription may contain errors not detected in proofreading. Please refer to my supervising physician's documentation if my documentation differs. Electronically Signed:  Hermila Pete DO, 10/08/22, 3:30 PM         Hermila Pete DO  Resident  10/08/22 1243

## 2022-12-06 ENCOUNTER — APPOINTMENT (OUTPATIENT)
Dept: GENERAL RADIOLOGY | Age: 77
End: 2022-12-06
Payer: COMMERCIAL

## 2022-12-06 ENCOUNTER — HOSPITAL ENCOUNTER (EMERGENCY)
Age: 77
Discharge: HOME | End: 2022-12-06
Attending: EMERGENCY MEDICINE
Payer: COMMERCIAL

## 2022-12-06 ENCOUNTER — APPOINTMENT (OUTPATIENT)
Dept: CT IMAGING | Age: 77
End: 2022-12-06
Payer: COMMERCIAL

## 2022-12-06 VITALS
HEIGHT: 62 IN | SYSTOLIC BLOOD PRESSURE: 131 MMHG | WEIGHT: 190 LBS | BODY MASS INDEX: 34.96 KG/M2 | HEART RATE: 72 BPM | OXYGEN SATURATION: 97 % | DIASTOLIC BLOOD PRESSURE: 51 MMHG | TEMPERATURE: 97.9 F | RESPIRATION RATE: 20 BRPM

## 2022-12-06 DIAGNOSIS — R10.13 ABDOMINAL PAIN, EPIGASTRIC: Primary | ICD-10-CM

## 2022-12-06 DIAGNOSIS — R05.1 ACUTE COUGH: ICD-10-CM

## 2022-12-06 LAB
ALBUMIN SERPL-MCNC: 4.5 G/DL (ref 3.5–5.1)
ALP BLD-CCNC: 66 U/L (ref 38–126)
ALT SERPL-CCNC: 30 U/L (ref 11–66)
ANION GAP SERPL CALCULATED.3IONS-SCNC: 15 MEQ/L (ref 8–16)
AST SERPL-CCNC: 25 U/L (ref 5–40)
ATYPICAL LYMPHOCYTES: ABNORMAL %
BACTERIA: ABNORMAL /HPF
BASOPHILS # BLD: 0.8 %
BASOPHILS ABSOLUTE: 0.1 THOU/MM3 (ref 0–0.1)
BETA-HYDROXYBUTYRATE: 1.74 MG/DL (ref 0.2–2.81)
BILIRUB SERPL-MCNC: 0.7 MG/DL (ref 0.3–1.2)
BILIRUBIN DIRECT: < 0.2 MG/DL (ref 0–0.3)
BILIRUBIN URINE: NEGATIVE
BLOOD, URINE: NEGATIVE
BUN BLDV-MCNC: 31 MG/DL (ref 7–22)
CALCIUM SERPL-MCNC: 8.9 MG/DL (ref 8.5–10.5)
CASTS 2: ABNORMAL /LPF
CASTS UA: ABNORMAL /LPF
CHARACTER, URINE: CLEAR
CHLORIDE BLD-SCNC: 94 MEQ/L (ref 98–111)
CO2: 24 MEQ/L (ref 23–33)
COLOR: YELLOW
CREAT SERPL-MCNC: 1 MG/DL (ref 0.4–1.2)
CRYSTALS, UA: ABNORMAL
EKG ATRIAL RATE: 70 BPM
EKG P AXIS: 35 DEGREES
EKG P-R INTERVAL: 158 MS
EKG Q-T INTERVAL: 380 MS
EKG QRS DURATION: 90 MS
EKG QTC CALCULATION (BAZETT): 410 MS
EKG R AXIS: -37 DEGREES
EKG T AXIS: 37 DEGREES
EKG VENTRICULAR RATE: 70 BPM
EOSINOPHIL # BLD: 0.6 %
EOSINOPHILS ABSOLUTE: 0.1 THOU/MM3 (ref 0–0.4)
EPITHELIAL CELLS, UA: ABNORMAL /HPF
ERYTHROCYTE [DISTWIDTH] IN BLOOD BY AUTOMATED COUNT: 13.2 % (ref 11.5–14.5)
ERYTHROCYTE [DISTWIDTH] IN BLOOD BY AUTOMATED COUNT: 44.5 FL (ref 35–45)
GFR SERPL CREATININE-BSD FRML MDRD: 58 ML/MIN/1.73M2
GLUCOSE BLD-MCNC: 249 MG/DL (ref 70–108)
GLUCOSE URINE: 500 MG/DL
HCT VFR BLD CALC: 48.9 % (ref 37–47)
HEMOGLOBIN: 16.6 GM/DL (ref 12–16)
IMMATURE GRANS (ABS): 1.45 THOU/MM3 (ref 0–0.07)
IMMATURE GRANULOCYTES: 10.3 %
INR BLD: 2.03 (ref 0.85–1.13)
KETONES, URINE: NEGATIVE
LEUKOCYTE ESTERASE, URINE: NEGATIVE
LIPASE: 60.3 U/L (ref 5.6–51.3)
LYMPHOCYTES # BLD: 21.7 %
LYMPHOCYTES ABSOLUTE: 3 THOU/MM3 (ref 1–4.8)
MCH RBC QN AUTO: 31.3 PG (ref 26–33)
MCHC RBC AUTO-ENTMCNC: 33.9 GM/DL (ref 32.2–35.5)
MCV RBC AUTO: 92.3 FL (ref 81–99)
MISCELLANEOUS 2: ABNORMAL
MONOCYTES # BLD: 12.9 %
MONOCYTES ABSOLUTE: 1.8 THOU/MM3 (ref 0.4–1.3)
NITRITE, URINE: NEGATIVE
NUCLEATED RED BLOOD CELLS: 0 /100 WBC
OSMOLALITY CALCULATION: 281.3 MOSMOL/KG (ref 275–300)
PATHOLOGIST REVIEW: ABNORMAL
PH UA: 6 (ref 5–9)
PLATELET # BLD: 156 THOU/MM3 (ref 130–400)
PMV BLD AUTO: 10.5 FL (ref 9.4–12.4)
POTASSIUM SERPL-SCNC: 4.6 MEQ/L (ref 3.5–5.2)
PROTEIN UA: 100
RBC # BLD: 5.3 MILL/MM3 (ref 4.2–5.4)
RBC URINE: ABNORMAL /HPF
RENAL EPITHELIAL, UA: ABNORMAL
SCAN OF BLOOD SMEAR: NORMAL
SEG NEUTROPHILS: 53.7 %
SEGMENTED NEUTROPHILS ABSOLUTE COUNT: 7.5 THOU/MM3 (ref 1.8–7.7)
SODIUM BLD-SCNC: 133 MEQ/L (ref 135–145)
SPECIFIC GRAVITY, URINE: > 1.03 (ref 1–1.03)
TOTAL PROTEIN: 8 G/DL (ref 6.1–8)
TROPONIN T: < 0.01 NG/ML
TROPONIN T: < 0.01 NG/ML
UROBILINOGEN, URINE: 0.2 EU/DL (ref 0–1)
WBC # BLD: 14 THOU/MM3 (ref 4.8–10.8)
WBC UA: ABNORMAL /HPF
YEAST: ABNORMAL

## 2022-12-06 PROCEDURE — 96374 THER/PROPH/DIAG INJ IV PUSH: CPT

## 2022-12-06 PROCEDURE — 6370000000 HC RX 637 (ALT 250 FOR IP): Performed by: STUDENT IN AN ORGANIZED HEALTH CARE EDUCATION/TRAINING PROGRAM

## 2022-12-06 PROCEDURE — 80053 COMPREHEN METABOLIC PANEL: CPT

## 2022-12-06 PROCEDURE — 2500000003 HC RX 250 WO HCPCS: Performed by: STUDENT IN AN ORGANIZED HEALTH CARE EDUCATION/TRAINING PROGRAM

## 2022-12-06 PROCEDURE — 74177 CT ABD & PELVIS W/CONTRAST: CPT

## 2022-12-06 PROCEDURE — 82248 BILIRUBIN DIRECT: CPT

## 2022-12-06 PROCEDURE — 83690 ASSAY OF LIPASE: CPT

## 2022-12-06 PROCEDURE — 85610 PROTHROMBIN TIME: CPT

## 2022-12-06 PROCEDURE — 71045 X-RAY EXAM CHEST 1 VIEW: CPT

## 2022-12-06 PROCEDURE — 93005 ELECTROCARDIOGRAM TRACING: CPT | Performed by: EMERGENCY MEDICINE

## 2022-12-06 PROCEDURE — 2580000003 HC RX 258: Performed by: STUDENT IN AN ORGANIZED HEALTH CARE EDUCATION/TRAINING PROGRAM

## 2022-12-06 PROCEDURE — 36415 COLL VENOUS BLD VENIPUNCTURE: CPT

## 2022-12-06 PROCEDURE — 84484 ASSAY OF TROPONIN QUANT: CPT

## 2022-12-06 PROCEDURE — A4216 STERILE WATER/SALINE, 10 ML: HCPCS | Performed by: STUDENT IN AN ORGANIZED HEALTH CARE EDUCATION/TRAINING PROGRAM

## 2022-12-06 PROCEDURE — 93010 ELECTROCARDIOGRAM REPORT: CPT | Performed by: INTERNAL MEDICINE

## 2022-12-06 PROCEDURE — 81001 URINALYSIS AUTO W/SCOPE: CPT

## 2022-12-06 PROCEDURE — 6360000004 HC RX CONTRAST MEDICATION: Performed by: STUDENT IN AN ORGANIZED HEALTH CARE EDUCATION/TRAINING PROGRAM

## 2022-12-06 PROCEDURE — 99285 EMERGENCY DEPT VISIT HI MDM: CPT

## 2022-12-06 PROCEDURE — 82010 KETONE BODYS QUAN: CPT

## 2022-12-06 PROCEDURE — 85025 COMPLETE CBC W/AUTO DIFF WBC: CPT

## 2022-12-06 RX ORDER — 0.9 % SODIUM CHLORIDE 0.9 %
1000 INTRAVENOUS SOLUTION INTRAVENOUS ONCE
Status: COMPLETED | OUTPATIENT
Start: 2022-12-06 | End: 2022-12-06

## 2022-12-06 RX ADMIN — FAMOTIDINE 20 MG: 10 INJECTION INTRAVENOUS at 12:00

## 2022-12-06 RX ADMIN — SODIUM CHLORIDE 1000 ML: 9 INJECTION, SOLUTION INTRAVENOUS at 13:57

## 2022-12-06 RX ADMIN — Medication: at 12:00

## 2022-12-06 RX ADMIN — IOPAMIDOL 80 ML: 755 INJECTION, SOLUTION INTRAVENOUS at 12:52

## 2022-12-06 ASSESSMENT — PAIN - FUNCTIONAL ASSESSMENT
PAIN_FUNCTIONAL_ASSESSMENT: 0-10

## 2022-12-06 ASSESSMENT — ENCOUNTER SYMPTOMS
SHORTNESS OF BREATH: 1
DIARRHEA: 0
NAUSEA: 1
CHEST TIGHTNESS: 1
VOMITING: 0
ABDOMINAL PAIN: 1
PHOTOPHOBIA: 0
COUGH: 1
CONSTIPATION: 0

## 2022-12-06 ASSESSMENT — PAIN SCALES - GENERAL
PAINLEVEL_OUTOF10: 8
PAINLEVEL_OUTOF10: 8
PAINLEVEL_OUTOF10: 5

## 2022-12-06 ASSESSMENT — PAIN DESCRIPTION - LOCATION
LOCATION: ABDOMEN;CHEST
LOCATION: ABDOMEN
LOCATION: ABDOMEN;CHEST

## 2022-12-06 ASSESSMENT — HEART SCORE: ECG: 0

## 2022-12-06 NOTE — ED NOTES
Patient to ED via EMS from home after developing chest pain and abdominal pain after eating breakfast. Patient states she felt sweaty and weak. Patient states she took two tums that did not help.  On arrival patient reports pain 8/10      Jory Dutta RN  12/06/22 3325

## 2022-12-06 NOTE — ED NOTES
Patient diagnosed with COVID 7 days ago.  Patient reports taking her course of ATB and antivirals      Aminah Nunn RN  12/06/22 7236

## 2022-12-06 NOTE — DISCHARGE INSTRUCTIONS
Today you were seen for cough and abdominal pain. It is likely that the pills are still left over from your COVID. Please follow-up with your primary care provider within the next few days. Please return to the ED if any worsening of condition. At this time you are stable for discharge.

## 2022-12-06 NOTE — ED PROVIDER NOTES
[facility] ENCOUNTER          Pt Name: Keara Mcdonald  MRN: [id number]  [place] 1945  Date of evaluation: 12/6/2022  Treating Resident Physician: Tono Brooks MD  Supervising Physician: wKame Rogers       Chief Complaint   Patient presents with    Abdominal Pain    Chest Pain     History obtained from the patient. HISTORY OF PRESENT ILLNESS    HPI  Keara Mcdonald is a 67 y.o. female who presents to the emergency department for evaluation of chest pain, epigastric pain shortness of breath, cough. Patient states that she was diagnosed with COVID about 6 days ago, since then, she has had increasing cough, green sputum in nature, now having some chest pain, pressure-like, 6 out of 10, not worsened with inspiration or coughing, also having epigastric pain, described as burning, 4 out of 10, associated with some nausea without vomiting. Patient has had decreased p.o. over the past couple days. States that her abdomen actually feels better after eating. The patient has no other acute complaints at this time. REVIEW OF SYSTEMS   Review of Systems   Constitutional:  Positive for appetite change and fatigue. Negative for chills, diaphoresis, fever and unexpected weight change. HENT: Negative. Eyes:  Negative for photophobia and visual disturbance. Respiratory:  Positive for cough, chest tightness and shortness of breath. Cardiovascular:  Positive for chest pain. Negative for palpitations and leg swelling. Gastrointestinal:  Positive for abdominal pain and nausea. Negative for constipation, diarrhea and vomiting. Endocrine: Negative. Genitourinary: Negative. Musculoskeletal: Negative. Neurological: Negative. Hematological: Negative. Psychiatric/Behavioral: Negative.           PAST MEDICAL AND SURGICAL HISTORY     Past Medical History:   Diagnosis Date    Arthritis     Asthma     CAD (coronary artery disease)     Deep vein thrombosis (DVT) (HCC)     Escherichia coli infection     H/O coagulation disorder 5/30/2014    HLD (hyperlipidemia)     HTN (hypertension)     Hx of blood clots     Joint pain     Morbid obesity ([facility]     NSTEMI (non-ST elevated myocardial infarction) ([facility] 5/30/2014    S/P cardiac catheterization: 5/30/2014: 99% distal RCA, 90% mid-LAD, 85% ostial diagonal, 60% LCx. 5/30/2014 5/30/2014: 99% distal RCA, 90% mid-LAD, 85% ostial diagonal, 60% LCx. Dr. Taylor Barry    Stroke [facility])     Swelling     Wheezing      Past Surgical History:   Procedure Laterality Date    ANKLE SURGERY  1990    CARDIOVASCULAR STRESS TEST  4 22 2010    No obvious stress induced ischemia. EF 71%. CORONARY ARTERY BYPASS GRAFT  6-3-14     x4    DIAGNOSTIC CARDIAC CATH LAB PROCEDURE      EYE SURGERY      HYSTERECTOMY (CERVIX STATUS UNKNOWN)  1972    TRANSTHORACIC ECHOCARDIOGRAM  4 22 2010    Normal LV size and systolic function. EF 55-65%. Mild TR. MEDICATIONS   No current facility-administered medications for this encounter.     Current Outpatient Medications:     isosorbide mononitrate (IMDUR) 30 MG extended release tablet, Take 1 tablet by mouth daily, Disp: 30 tablet, Rfl: 11    pantoprazole (PROTONIX) 20 MG tablet, Take 2 tablets by mouth daily, Disp: 30 tablet, Rfl: 0    levothyroxine (SYNTHROID) 50 MCG tablet, Take 50 mcg by mouth Daily, Disp: , Rfl:     metoprolol (LOPRESSOR) 25 MG tablet, TAKE ONE-HALF TABLET BY MOUTH TWICE DAILY, Disp: 90 tablet, Rfl: 1    warfarin (COUMADIN) 2 MG tablet, Take 2 mg by mouth daily (Dr Jatinder Bernal follows), Disp: , Rfl:     hydrOXYzine (ATARAX) 25 MG tablet, Take 25 mg by mouth daily, Disp: , Rfl:     vitamin B-12 (CYANOCOBALAMIN) 1000 MCG tablet, Take 1,000 mcg by mouth daily, Disp: , Rfl:     Cholecalciferol (VITAMIN D3) 5000 UNITS CAPS, Take 1 capsule by mouth daily, Disp: , Rfl:     Lactobacillus (ACIDOPHILUS) TABS, Take 2 tablets by mouth daily Accuflora probiotic acidophilus, Disp: , Rfl:     amLODIPine (NORVASC) 5 MG tablet, Take 5 mg by mouth daily , Disp: , Rfl:     spironolactone (ALDACTONE) 25 MG tablet, Take 25 mg by mouth 2 times daily , Disp: , Rfl:     albuterol (PROVENTIL) (2.5 MG/3ML) 0.083% nebulizer solution, Take 2.5 mg by nebulization 4 times daily as needed. , Disp: , Rfl:     Coenzyme Q10 (COQ10 PO), Take 100 mg by mouth daily , Disp: , Rfl:       SOCIAL HISTORY     Social History     Social History Narrative    Not on file     Social History     Tobacco Use    Smoking status: Former     Packs/day: 0.20     Years: 24.00     Pack years: 4.80     Types: Cigarettes     Quit date: 1982     Years since quittin.4    Smokeless tobacco: Never   Substance Use Topics    Alcohol use: No    Drug use: No         ALLERGIES     Allergies   Allergen Reactions    Aspirin     Codeine     Darvocet [Propoxyphene N-Acetaminophen]     Darvon [Propoxyphene Hcl]     Humibid-Dm [Dextromethorphan-Guaifenesin]     Macrobid [Nitrofurantoin Monohydrate Macrocrystals]     Other      CIPRO-HC OTIC    Pcn [Penicillins]     Phenobarbital     Statins     Sulfa Antibiotics     Tessalon [Benzonatate]          FAMILY HISTORY     Family History   Problem Relation Age of Onset    Heart Disease Mother     Diabetes Mother     Heart Disease Father          PREVIOUS RECORDS   Previous records reviewed: Medical, past surgical, medications, allergies        PHYSICAL EXAM     ED Triage Vitals [22 1050]   BP Temp Temp Source Heart Rate Resp SpO2 Height Weight   139/69 97.9 °F (36.6 °C) Oral 74 20 99 % 5' 2\" (1.575 m) 190 lb (86.2 kg)     Initial vital signs and nursing assessment reviewed and normal. Body mass index is 34.75 kg/m². Pulsoximetry is normal per my interpretation. Additional Vital Signs:  Vitals:    22 1504   BP: (!) 131/51   Pulse: 72   Resp: 20   Temp:    SpO2: 97%       Physical Exam  Constitutional:       Appearance: She is well-developed.    HENT: Head: Normocephalic and atraumatic. Right Ear: External ear normal.      Left Ear: External ear normal.      Nose: Nose normal.      Mouth/Throat:      Mouth: Mucous membranes are moist.      Pharynx: Oropharynx is clear. Eyes:      Extraocular Movements: Extraocular movements intact. Cardiovascular:      Rate and Rhythm: Normal rate and regular rhythm. Heart sounds: Normal heart sounds. Pulmonary:      Effort: Pulmonary effort is normal.      Breath sounds: Normal breath sounds. Abdominal:      General: Abdomen is flat. Bowel sounds are normal. There is no distension. Palpations: Abdomen is soft. Tenderness: There is abdominal tenderness in the epigastric area. Musculoskeletal:      Right lower leg: No edema. Left lower leg: No edema. Skin:     General: Skin is warm and dry. Capillary Refill: Capillary refill takes less than 2 seconds. Neurological:      General: No focal deficit present. Mental Status: She is alert and oriented to person, place, and time. MEDICAL DECISION MAKING   Initial Assessment: This is a 58-year-old female, history of CAD, hypertension, DVT, presenting with chest pain and abdominal pain. Physical exam significant for rales bilaterally, no increased work of breathing, saturating well. Patient is chronically anticoagulated, no concern for PE at this time. Plan:   BC, BMP, troponin, EKG, x-ray  X-ray shows no pneumonia. Labs grossly unremarkable. Troponin x2 is negative. CT abdomen shows no acute findings. Patient symptoms resolved after GI cocktail and Pepcid. Patient chronically on PPI, but apparently has been taking since she has had decreased p.o  Discharged home with strict return precautions, follow-up. Explained to the patient that she likely is having continued symptoms of her COVID. Patient and family understand.     Heart Score    Heart Score for chest pain patients  History: Slightly Suspicious  ECG: Normal  Patient Age: > 72 years  *Risk factors for Atherosclerotic disease: Hypercholesterolemia, Diabetes Mellitus, Hypertension, Obesity, Coronary Artery Disease  Risk Factors: > 3 Risk factors or history of atherosclerotic disease*  Troponin: < 1X normal limit  Heart Score Total: 4    HEART Score recommendations:  Score 0 - 3:   <1.7%  risk of MACE over next 6 wks = Outpatient workup  Score 4 - 6: 12-16% risk of MACE over next 6 wks = Admission for observation  Score 7- 10: 50-65% risk of MACE over next 6 wks = Early invasive strategy    MACE: Major Acute Cardiac Event (All Cause Mortality, AMI or revascularization)  Chest Pain in the Emergency Room: A Multicenter Validation of the [address] Shavon BE, Six AJ, Magi RANDI, Henrietta TP, [employer], Henrietta EG, Abbie SH, [facility]. Crit Pathw Cardiol. 2010 Sep; 9(3): 164-169. \"A prospective validation of the HEART score for chest pain patients at the emergency department. \" Int J Cardiol. 2013 Oct 3;168(3):2153-8. doi: 10.1016/j.ijcard. 2013.01.255. Epub 2013 Mar 7.           ED RESULTS   Laboratory results:  Labs Reviewed   BASIC METABOLIC PANEL - Abnormal; Notable for the following components:       Result Value    Sodium 133 (*)     Chloride 94 (*)     Glucose 249 (*)     BUN 31 (*)     All other components within normal limits   CBC WITH AUTO DIFFERENTIAL - Abnormal; Notable for the following components:    WBC 14.0 (*)     Hemoglobin 16.6 (*)     Hematocrit 48.9 (*)     Monocytes Absolute 1.8 (*)     Immature Grans (Abs) 1.45 (*)     All other components within normal limits   LIPASE - Abnormal; Notable for the following components:    Lipase 60.3 (*)     All other components within normal limits   GLOMERULAR FILTRATION RATE, ESTIMATED - Abnormal; Notable for the following components:    Est, Glom Filt Rate 58 (*)     All other components within normal limits   PROTIME-INR - Abnormal; Notable for the following components:    INR 2.03 (*) All other components within normal limits   URINE WITH REFLEXED MICRO - Abnormal; Notable for the following components:    Glucose, Ur 500 (*)     Specific Gravity, Urine > 1.030 (*)     Protein,  (*)     All other components within normal limits   HEPATIC FUNCTION PANEL   TROPONIN   ANION GAP   OSMOLALITY   SCAN OF BLOOD SMEAR   TROPONIN   BETA-HYDROXYBUTYRATE       Radiologic studies results:  CT ABDOMEN PELVIS W IV CONTRAST Additional Contrast? None   Final Result   1. There is no acute abdominal or pelvic abnormalities. Numerous chronic findings detailed above report. **This report has been created using voice recognition software. It may contain minor errors which are inherent in voice recognition technology. **      Final report electronically signed by Dr. Dionte Mendez on 12/6/2022 1:13 PM      XR CHEST PORTABLE   Final Result   No acute intrathoracic process. **This report has been created using voice recognition software. It may contain minor errors which are inherent in voice recognition technology. **      Final report electronically signed by Dr Nahomi Angulo on 12/6/2022 12:09 PM          ED Medications administered this visit:   Medications   aluminum & magnesium hydroxide-simethicone (MAALOX) 30 mL, lidocaine viscous hcl (XYLOCAINE) 5 mL (GI COCKTAIL) ( Oral Given 12/6/22 1200)   famotidine (PEPCID) 20 mg in sodium chloride (PF) 0.9 % 10 mL injection (20 mg IntraVENous Given 12/6/22 1200)   iopamidol (ISOVUE-370) 76 % injection 80 mL (80 mLs IntraVENous Given 12/6/22 1252)   0.9 % sodium chloride bolus (0 mLs IntraVENous Stopped 12/6/22 1558)         ED COURSE         Strict return precautions and follow up instructions were discussed with the patient prior to discharge, with which the patient agrees.       MEDICATION CHANGES     Discharge Medication List as of 12/6/2022  4:00 PM            FINAL DISPOSITION     Final diagnoses:   Abdominal pain, epigastric   Acute cough Condition: condition: good  Dispo: Discharge to home      This transcription was electronically signed. Parts of this transcriptions may have been dictated by use of voice recognition software and electronically transcribed, and parts may have been transcribed with the assistance of an ED scribe. The transcription may contain errors not detected in proofreading. Please refer to my supervising physician's documentation if my documentation differs.     Electronically Signed: Rosenda Carrillo MD, 12/06/22, 10:40 PM          Rosenda Carrillo MD  Resident  12/06/22 7872

## 2023-09-28 ENCOUNTER — OFFICE VISIT (OUTPATIENT)
Dept: CARDIOLOGY CLINIC | Age: 78
End: 2023-09-28
Payer: COMMERCIAL

## 2023-09-28 VITALS
BODY MASS INDEX: 37.91 KG/M2 | WEIGHT: 206 LBS | HEIGHT: 62 IN | HEART RATE: 82 BPM | SYSTOLIC BLOOD PRESSURE: 144 MMHG | DIASTOLIC BLOOD PRESSURE: 76 MMHG

## 2023-09-28 DIAGNOSIS — I25.810 CORONARY ARTERY DISEASE INVOLVING CORONARY BYPASS GRAFT OF NATIVE HEART WITHOUT ANGINA PECTORIS: Primary | ICD-10-CM

## 2023-09-28 DIAGNOSIS — E78.01 FAMILIAL HYPERCHOLESTEROLEMIA: ICD-10-CM

## 2023-09-28 DIAGNOSIS — I10 PRIMARY HYPERTENSION: ICD-10-CM

## 2023-09-28 PROCEDURE — 1123F ACP DISCUSS/DSCN MKR DOCD: CPT | Performed by: NUCLEAR MEDICINE

## 2023-09-28 PROCEDURE — 99213 OFFICE O/P EST LOW 20 MIN: CPT | Performed by: NUCLEAR MEDICINE

## 2023-09-28 PROCEDURE — 3078F DIAST BP <80 MM HG: CPT | Performed by: NUCLEAR MEDICINE

## 2023-09-28 PROCEDURE — 3077F SYST BP >= 140 MM HG: CPT | Performed by: NUCLEAR MEDICINE

## 2023-09-28 RX ORDER — FAMOTIDINE 40 MG/1
40 TABLET, FILM COATED ORAL DAILY
COMMUNITY

## 2024-02-16 ENCOUNTER — HOSPITAL ENCOUNTER (OUTPATIENT)
Dept: INTERVENTIONAL RADIOLOGY/VASCULAR | Age: 79
End: 2024-02-16
Attending: FAMILY MEDICINE
Payer: MEDICARE

## 2024-02-16 DIAGNOSIS — R09.89 ABNORMAL CHEST SOUNDS: ICD-10-CM

## 2024-02-16 PROCEDURE — 93923 UPR/LXTR ART STDY 3+ LVLS: CPT

## 2024-05-01 ENCOUNTER — HOSPITAL ENCOUNTER (OUTPATIENT)
Dept: WOUND CARE | Age: 79
Discharge: HOME | End: 2024-05-01
Attending: INTERNAL MEDICINE
Payer: MEDICARE

## 2024-05-01 VITALS
HEART RATE: 87 BPM | DIASTOLIC BLOOD PRESSURE: 67 MMHG | OXYGEN SATURATION: 96 % | RESPIRATION RATE: 18 BRPM | TEMPERATURE: 98.4 F | SYSTOLIC BLOOD PRESSURE: 166 MMHG

## 2024-05-01 DIAGNOSIS — T81.89XA NON-HEALING SURGICAL WOUND, INITIAL ENCOUNTER: Primary | ICD-10-CM

## 2024-05-01 PROCEDURE — 99213 OFFICE O/P EST LOW 20 MIN: CPT

## 2024-05-01 NOTE — PROGRESS NOTES
Grafix and kerecis graft authorizations faxed. Lenny order faxed to [facility] pharmacy for approval

## 2024-05-01 NOTE — CONSULTS
[facility]         Consult and Procedure Note      Radha Freeman  MEDICAL RECORD NUMBER:  [id number]  AGE: 78 y.o.   GENDER: female  : 1945  EPISODE DATE:  2024    Subjective:   Nonhealing scalp wound     HISTORY OF PRESENT ILLNESS   She is a 78-year-old female patient who had excision of a skin cancer from the scalp over a year ago with wide surgical excision and has a nonhealing wound.  The wound failed to heal despite grafting and was referred to the wound clinic.  She has been using antibiotic ointment has exposed bone.  She has history of coronary artery disease.  She denies any fever or chills there is no any drainage from the scalp.    PAST MEDICAL HISTORY                 Diagnosis Date    Arthritis     Asthma     CAD (coronary artery disease)     Deep vein thrombosis (DVT) ([facility])     Escherichia coli infection     H/O coagulation disorder 2014    HLD (hyperlipidemia)     HTN (hypertension)     Hx of blood clots     Joint pain     Morbid obesity ([facility])     NSTEMI (non-ST elevated myocardial infarction) ([facility]) 2014    S/P cardiac catheterization: 2014: 99% distal RCA, 90% mid-LAD, 85% ostial diagonal, 60% LCx. 2014: 99% distal RCA, 90% mid-LAD, 85% ostial diagonal, 60% LCx. Dr. Wilson    Stroke ([facility])     Swelling     Wheezing      PAST SURGICAL HISTORY     PAST SURGICAL HISTORY    Past Surgical History:   Procedure Laterality Date    ANKLE SURGERY      CARDIOVASCULAR STRESS TEST  2010    No obvious stress induced ischemia. EF 71%.     CORONARY ARTERY BYPASS GRAFT  6-3-14     x4    DIAGNOSTIC CARDIAC CATH LAB PROCEDURE      EYE SURGERY      HYSTERECTOMY (CERVIX STATUS UNKNOWN)      TRANSTHORACIC ECHOCARDIOGRAM  2010    Normal LV size and systolic function. EF 55-65%. Mild TR.      FAMILY HISTORY         Family History   Problem Relation Age of Onset    Heart Disease Mother     Diabetes Mother     Heart Disease Father

## 2024-05-01 NOTE — PROGRESS NOTES
Wound Care Supplies      Supply Company:     [employer] [address] [place] [address] f: [phone] f: [phone] p: [phone] [email or web address]      Ordering Center:   [facility]  [address] [address]  [phone]  WOUND CARE Dept: [phone]   FAX NUMBER [phone]    Patient Information:      Mendez Freeman  [address]  [place] [address]   [phone]   : 1945  AGE: 78 y.o.     GENDER: female   EPISODE DATE: 2024    Insurance:      PRIMARY INSURANCE:  Plan: [employer] MEDIC[employer]  Coverage: HUMANA MEDICARE  Effective Date: 2024  Group Number: [unfilled]  Subscriber Number: [id number] - (Medicare Managed)    Payer/Plan Subscr  Sex Relation Sub. Ins. ID Effective Group Num   1. [employer]NA MEDICA* MENDEZ FREEMAN* 1945 Female Self [id number]                                   [address] [address]       Patient Wound Information:      Problem List Items Addressed This Visit    None      WOUNDS REQUIRING DRESSING SUPPLIES:     Wound 24 Head (Active)   Wound Image   24 08   Wound Etiology Non-Healing Surgical 24 0844   Dressing Status New dressing applied 24 08   Wound Cleansed Cleansed with saline 24 08   Dressing/Treatment Moist to dry;Silicone border;Foam 24 08   Offloading for Diabetic Foot Ulcers Offloading not required 24 0844   Wound Length (cm) 2.5 cm 24 0844   Wound Width (cm) 3 cm 24 08   Wound Depth (cm) 0.3 cm 24 08   Wound Surface Area (cm^2) 7.5 cm^2 24 08   Wound Volume (cm^3) 2.25 cm^3 24 08   Wound Assessment Eschar dry;Hyper granulation tissue;Slough 24 08   Drainage Amount Moderate (25-50%) 05/844   Drainage Description Serosanguinous 24   Odor None 24   Carmen-wound Assessment Dry/flaky;Blanchable erythema 24   Margins Attached edges 24   Wound

## 2024-05-01 NOTE — PLAN OF CARE
Problem: Wound:  Goal: Will show signs of wound healing; wound closure and no evidence of infection  Description: Will show signs of wound healing; wound closure and no evidence of infection  Outcome: Progressing   Seen today for scalp wound. See AVS for discharge.   Care plan reviewed with patient and daughter.  Patient and daughter verbalize understanding of the plan of care and contribute to goal setting.

## 2024-05-01 NOTE — PATIENT INSTRUCTIONS
Visit Discharge/Physician Orders:  - Mechanical Debridement was completed today by using a saline and gauze.   -stop the salve you were using   -will apply for a skin graft   -do not get your incision wet, wear a cap when you shower     Wound Location: scalp     Dressing orders:     1) Gather wound care supplies and arrange on clean table.     2) Wash your hands with soap and water or use alcohol based hand  for 20 seconds (sing \"Happy Birthday\" twice).    3) Cleanse wounds with normal saline or wound cleanser and gauze. Pat dry with clean gauze.    4) scalp: apply skin prep around the wound. Than apply saline moistened guaze to wound. Cover with silicone boarder foam dressing. Change daily.  Once you get santyl ointment. Switch to this dressing- apply santyl cream to the wound. Cover with silicone boarder foam dressing. Change daily     Keep all dressings clean & dry.    Follow up visit:   2 Weeks Wednesday May 15th at 9:45 am     Supplies:    Duration of dressings:     We have sent your supply order to the following company:  [employer] Phone # [phone]   If you don't receive the items you were expecting or don't know what the items are that you received, call the company where the order was sent.   If you are unable to obtain wound supplies, continue to use the supplies you have available until you are able to reach us. It is most important to keep the wound covered at all times.  It is YOUR responsibility to make sure that supplies are re-ordered before you run out. Re-order telephone numbers are included in each package.     Keep next scheduled appointment. Please give 24 hour notice if unable to keep appointment. [phone]    If you experience any of the following, please call the Wound Care Service during business hours: Monday through Friday 8:00 am - 4:30 pm  ([phone]).   *Increase in pain   *Temperature over 101   *Increase in drainage from your wound or a foul odor   *Uncontrolled

## 2024-05-06 ENCOUNTER — TELEPHONE (OUTPATIENT)
Dept: WOUND CARE | Age: 79
End: 2024-05-06

## 2024-05-06 NOTE — TELEPHONE ENCOUNTER
Called Dr Serrano's office to get more notes from office, no measurements noted past 30 days. Will resubmit order for kerecis and grafix after next visit with Dr Garcia   Patient notified that she needs to have lab work done prior to her next visit, voices understanding.

## 2024-05-09 ENCOUNTER — HOSPITAL ENCOUNTER (OUTPATIENT)
Age: 79
Discharge: HOME | End: 2024-05-09
Payer: MEDICARE

## 2024-05-09 DIAGNOSIS — T81.89XA NON-HEALING SURGICAL WOUND, INITIAL ENCOUNTER: ICD-10-CM

## 2024-05-09 LAB — PREALB SERPL-MCNC: 22.8 MG/DL (ref 20–40)

## 2024-05-09 PROCEDURE — 36415 COLL VENOUS BLD VENIPUNCTURE: CPT

## 2024-05-09 PROCEDURE — 84134 ASSAY OF PREALBUMIN: CPT

## 2024-05-10 ENCOUNTER — TELEPHONE (OUTPATIENT)
Dept: WOUND CARE | Age: 79
End: 2024-05-10

## 2024-05-10 NOTE — TELEPHONE ENCOUNTER
----- Message from Zoey Taveras sent at 5/10/2024  8:23 AM EDT -----   [phone] MENDEZ STATES THAT HER HEAD WOUND IS BLEEDING AND HAS A GREEN-[place] RED DRAINAGE. REPORTS NO FEVER/CHILLS.

## 2024-05-10 NOTE — TELEPHONE ENCOUNTER
Updated Dr Garcia with patient's concerns. He recommends she go to the urgent care to have the blood checked out. Updated patient with the doctors recommendations. All questions answered.

## 2024-05-15 ENCOUNTER — HOSPITAL ENCOUNTER (OUTPATIENT)
Dept: WOUND CARE | Age: 79
Discharge: HOME | End: 2024-05-15
Attending: INTERNAL MEDICINE
Payer: MEDICARE

## 2024-05-15 VITALS — RESPIRATION RATE: 20 BRPM | OXYGEN SATURATION: 98 % | HEART RATE: 84 BPM | TEMPERATURE: 98.3 F

## 2024-05-15 PROCEDURE — 99213 OFFICE O/P EST LOW 20 MIN: CPT

## 2024-05-15 ASSESSMENT — PAIN SCALES - GENERAL: PAINLEVEL_OUTOF10: 6

## 2024-05-15 ASSESSMENT — PAIN DESCRIPTION - LOCATION: LOCATION: HEAD

## 2024-05-15 NOTE — PROGRESS NOTES
Wound Care Supplies      Supply Company:     [employer] [address] [place] [address] f: [phone] f: [phone] p: [phone] [email or web address]      Ordering Center:   [facility]  [address] [address]  [phone]  WOUND CARE Dept: [phone]   FAX NUMBER [phone]    Patient Information:      Mendez Freeman  [address]  [place] [address]   [phone]   : 1945  AGE: 78 y.o.     GENDER: female   EPISODE DATE: 5/15/2024    Insurance:      PRIMARY INSURANCE:  Plan: [employer] MED[employer]  Coverage: HUMANA MEDICARE  Effective Date: 2024  Group Number: [unfilled]  Subscriber Number: [id number] - (Medicare Managed)    Payer/Plan Subscr  Sex Relation Sub. Ins. ID Effective Group Num   1. [employer] MEDICA* MENDEZ FREEMAN* 1945 Female Self [id number]                                   [address] [address]       Patient Wound Information:      Problem List Items Addressed This Visit    None      WOUNDS REQUIRING DRESSING SUPPLIES:     Wound 24 Head (Active)   Wound Image   05/15/24 09   Wound Etiology Non-Healing Surgical 05/15/24 0958   Dressing Status New dressing applied 24 0844   Wound Cleansed Cleansed with saline 05/15/24 0958   Dressing/Treatment Collagen;Moist to dry;Silicone border;Foam 05/15/24 09   Offloading for Diabetic Foot Ulcers Offloading not required 05/15/24 0958   Wound Length (cm) 2.5 cm 05/15/24 0958   Wound Width (cm) 4.5 cm 05/15/24 0958   Wound Depth (cm) 0.2 cm 05/15/24 09   Wound Surface Area (cm^2) 11.25 cm^2 05/15/24 0958   Change in Wound Size % (l*w) -50 05/15/24 09   Wound Volume (cm^3) 2.25 cm^3 05/15/24 09   Wound Healing % 0 05/15/24 09   Wound Assessment Hyper granulation tissue;Slough 05/15/24 0958   Drainage Amount Moderate (25-50%) 05/15/24 0958   Drainage Description Serosanguinous 05/15/24 0958   Odor None 05/15/24 0958   Carmen-wound Assessment

## 2024-05-15 NOTE — PROGRESS NOTES
[facility]         Consult and Procedure Note      Radha Freeman  MEDICAL RECORD NUMBER:  [id number]  AGE: 78 y.o.   GENDER: female  : 1945  EPISODE DATE:  5/15/2024    Subjective:   Non healing scalp wound:     HISTORY OF PRESENT ILLNESS   She is a 78-year-old female patient who had excision of a skin cancer from the scalp over a year ago with wide surgical excision and has a nonhealing wound.  The wound failed to heal despite grafting and was referred to the wound clinic.moist dressing was applied  She has history of coronary artery disease.  The wound is starting to granulate.she has no new complaints    PAST MEDICAL HISTORY                 Diagnosis Date    Arthritis     Asthma     CAD (coronary artery disease)     Deep vein thrombosis (DVT) ([facility])     Escherichia coli infection     H/O coagulation disorder 2014    HLD (hyperlipidemia)     HTN (hypertension)     Hx of blood clots     Joint pain     Morbid obesity ([facility])     NSTEMI (non-ST elevated myocardial infarction) ([facility]) 2014    Postprocedural non-healing wound     S/P cardiac catheterization: 2014: 99% distal RCA, 90% mid-LAD, 85% ostial diagonal, 60% LCx. 2014: 99% distal RCA, 90% mid-LAD, 85% ostial diagonal, 60% LCx. Dr. Wilson    Stroke ([facility])     Surgical wound, non healing 2024    Non healing scalp wound following wide excision of skin cancer. Has exposed skull    Swelling     Wheezing      PAST SURGICAL HISTORY     PAST SURGICAL HISTORY    Past Surgical History:   Procedure Laterality Date    ANKLE SURGERY      CARDIOVASCULAR STRESS TEST  2010    No obvious stress induced ischemia. EF 71%.     CORONARY ARTERY BYPASS GRAFT  6-3-14     x4    DIAGNOSTIC CARDIAC CATH LAB PROCEDURE      EYE SURGERY      HYSTERECTOMY (CERVIX STATUS UNKNOWN)      TRANSTHORACIC ECHOCARDIOGRAM  2010    Normal LV size and systolic function. EF 55-65%. Mild TR.      FAMILY HISTORY

## 2024-05-15 NOTE — PLAN OF CARE
Problem: Wound:  Goal: Will show signs of wound healing; wound closure and no evidence of infection  Description: Will show signs of wound healing; wound closure and no evidence of infection  Outcome: Progressing     Patient seen at the wound clinic today for evaluation of scalp wound, please see AVS. Care plan reviewed with patient.  Patient verbalizes understanding of the plan of care and contribute to goal setting.

## 2024-05-15 NOTE — PATIENT INSTRUCTIONS
Visit Discharge/Physician Orders:  - Mechanical Debridement was completed today by using a saline and gauze.   - Awaiting 30 days of measurements to apply for a skin graft   - Do not get your incision wet, wear a cap when you shower     Wound Location: scalp     Dressing orders:     1) Gather wound care supplies and arrange on clean table.     2) Wash your hands with soap and water or use alcohol based hand  for 20 seconds (sing \"Happy Birthday\" twice).    3) Cleanse wounds with normal saline or wound cleanser and gauze. Pat dry with clean gauze.    4) Scalp: Apply skin prep around the wound. Than apply collagen to the wound, then cover saline moistened guaze to wound (wound cleanser if unable to obtain sterile saline). Cover with silicone boarder foam dressing. Change daily.  Keep all dressings clean & dry.    Follow up visit:   2 Weeks Wednesday May 29th at 9:30 am       We have sent your supply order to the following company:  [employer] Phone # [phone]   If you don't receive the items you were expecting or don't know what the items are that you received, call the company where the order was sent.   If you are unable to obtain wound supplies, continue to use the supplies you have available until you are able to reach us. It is most important to keep the wound covered at all times.  It is YOUR responsibility to make sure that supplies are re-ordered before you run out. Re-order telephone numbers are included in each package.     Keep next scheduled appointment. Please give 24 hour notice if unable to keep appointment. [phone]    If you experience any of the following, please call the Wound Care Service during business hours: Monday through Friday 8:00 am - 4:30 pm  ([phone]).   *Increase in pain   *Temperature over 101   *Increase in drainage from your wound or a foul odor   *Uncontrolled swelling   *Need for compression bandage changes due to slippage, breakthrough drainage    If you need

## 2024-05-22 ENCOUNTER — TELEPHONE (OUTPATIENT)
Dept: WOUND CARE | Age: 79
End: 2024-05-22

## 2024-05-22 DIAGNOSIS — T81.89XD NON-HEALING SURGICAL WOUND, SUBSEQUENT ENCOUNTER: Primary | ICD-10-CM

## 2024-05-22 NOTE — TELEPHONE ENCOUNTER
----- Message from Zoey Taveras sent at 5/22/2024 10:05 AM EDT -----   [phone] MENDEZ CALLED TO F/U ON HER INSURANCE AND SUPPLIES? WERE WE ABLE TO ORDER FOR HER?

## 2024-05-22 NOTE — TELEPHONE ENCOUNTER
Called [employer] and they will ship out the order today. They state they went to a new system and are not sure what happened with her order.

## 2024-05-24 ENCOUNTER — TELEPHONE (OUTPATIENT)
Dept: WOUND CARE | Age: 79
End: 2024-05-24

## 2024-05-24 NOTE — TELEPHONE ENCOUNTER
Patient called and states she is unable to get new supply order through [facility] until 5/29/24 due to insurance. She ordered supplies on [employer] to get her through until covered by insurance.

## 2024-05-29 ENCOUNTER — HOSPITAL ENCOUNTER (OUTPATIENT)
Dept: WOUND CARE | Age: 79
Discharge: HOME | End: 2024-05-29
Attending: INTERNAL MEDICINE
Payer: MEDICARE

## 2024-05-29 VITALS
RESPIRATION RATE: 20 BRPM | OXYGEN SATURATION: 95 % | TEMPERATURE: 98.1 F | DIASTOLIC BLOOD PRESSURE: 77 MMHG | SYSTOLIC BLOOD PRESSURE: 178 MMHG | HEART RATE: 80 BPM

## 2024-05-29 PROCEDURE — 99213 OFFICE O/P EST LOW 20 MIN: CPT

## 2024-05-29 RX ORDER — BUMETANIDE 1 MG/1
1 TABLET ORAL DAILY
COMMUNITY

## 2024-05-29 NOTE — PROGRESS NOTES
[facility]         Consult and Procedure Note      Radha Freeman  MEDICAL RECORD NUMBER:  [id number]  AGE: 78 y.o.   GENDER: female  : 1945  EPISODE DATE:  2024    Subjective:   Non healing scalp wound:     HISTORY OF PRESENT ILLNESS   She is a 78-year-old female patient who had excision of a skin cancer from the scalp over a year ago with wide surgical excision and has a nonhealing wound.  The wound failed to heal despite grafting and was referred to the wound clinic.moist dressing was applied  She has history of coronary artery disease. The wound is slowly improving.    PAST MEDICAL HISTORY                 Diagnosis Date    Arthritis     Asthma     CAD (coronary artery disease)     Deep vein thrombosis (DVT) ([facility])     Escherichia coli infection     H/O coagulation disorder 2014    HLD (hyperlipidemia)     HTN (hypertension)     Hx of blood clots     Joint pain     Morbid obesity ([facility])     NSTEMI (non-ST elevated myocardial infarction) ([facility]) 2014    Postprocedural non-healing wound     S/P cardiac catheterization: 2014: 99% distal RCA, 90% mid-LAD, 85% ostial diagonal, 60% LCx. 2014: 99% distal RCA, 90% mid-LAD, 85% ostial diagonal, 60% LCx. Dr. Wilson    Stroke ([facility])     Surgical wound, non healing 2024    Non healing scalp wound following wide excision of skin cancer. Has exposed skull    Swelling     Wheezing      PAST SURGICAL HISTORY     PAST SURGICAL HISTORY    Past Surgical History:   Procedure Laterality Date    ANKLE SURGERY      CARDIOVASCULAR STRESS TEST  2010    No obvious stress induced ischemia. EF 71%.     CORONARY ARTERY BYPASS GRAFT  6-3-14     x4    DIAGNOSTIC CARDIAC CATH LAB PROCEDURE      EYE SURGERY      HYSTERECTOMY (CERVIX STATUS UNKNOWN)      TRANSTHORACIC ECHOCARDIOGRAM  2010    Normal LV size and systolic function. EF 55-65%. Mild TR.      FAMILY HISTORY         Family History   Problem 
Dry/flaky;Blanchable erythema 05/29/24 0936   Margins Attached edges 05/29/24 0936   Wound Thickness Description not for Pressure Injury Full thickness 05/29/24 0936   Number of days: 28          Supplies Requested :        *DISPENSE AS WRITTEN*    WOUND #: 1   PRIMARY DRESSING:  None   Zetuvit Plus Silicone Border SAP Dressing, small sacral, 7\"x 7\"  - Dispense as written      FREQUENCY OF DRESSING CHANGES:  Daily         ADDITIONAL ITEMS:  [] Gloves Small  [x] Gloves Medium [] Gloves Large [] Gloves XLarge  [] Tape 1\" [x] Tape 2\" [] Tape 3\"  [] Medipore Tape  [x] Saline  [] Skin Prep   [] Adhesive Remover   [] Cotton Tip Applicators   [] Other:    Patient Wound(s) Debrided: [x] Yes if yes please add date 5-29-24   [] No    Debribement Type: Mechanical     Patient currently being seen by Home Health: [] Yes   [x] No    Duration for needed supplies:  []15  [x]30  []60  []90 Days        Electronically signed by Bonnie Berumen RN on 5/29/2024 at 9:57 AM     Provider Information:      PROVIDER'S NAME: Dm Garcia MD    NPI: [id number]

## 2024-05-29 NOTE — PLAN OF CARE
Problem: Wound:  Goal: Will show signs of wound healing; wound closure and no evidence of infection  Description: Will show signs of wound healing; wound closure and no evidence of infection  Outcome: Progressing   Seen today for scalp wound. See AVS for discharge.  Care plan reviewed with patient and family.  Patient and family verbalizes understanding of the plan of care and contribute to goal setting.

## 2024-05-29 NOTE — PATIENT INSTRUCTIONS
Visit Discharge/Physician Orders:  - Mechanical Debridement was completed today by using a saline and gauze.   - will send in for a skin substitute   - Do not get your incision wet, wear a cap when you shower     Wound Location: scalp     Dressing orders:     1) Gather wound care supplies and arrange on clean table.     2) Wash your hands with soap and water or use alcohol based hand  for 20 seconds (sing \"Happy Birthday\" twice).    3) Cleanse wounds with normal saline or wound cleanser and gauze. Pat dry with clean gauze.    4) Scalp: Apply skin prep around the wound. Than apply collagen to the wound, then cover saline moistened guaze to wound (wound cleanser if unable to obtain sterile saline). Cover with silicone boarder foam dressing. Change daily.  Keep all dressings clean & dry.    Follow up visit:   2 Weeks Wednesday June 12 th at 9:45 am       We have sent your supply order to the following company:  [employer] Phone # [phone]   If you don't receive the items you were expecting or don't know what the items are that you received, call the company where the order was sent.   If you are unable to obtain wound supplies, continue to use the supplies you have available until you are able to reach us. It is most important to keep the wound covered at all times.  It is YOUR responsibility to make sure that supplies are re-ordered before you run out. Re-order telephone numbers are included in each package.     Keep next scheduled appointment. Please give 24 hour notice if unable to keep appointment. [phone]    If you experience any of the following, please call the Wound Care Service during business hours: Monday through Friday 8:00 am - 4:30 pm  ([phone]).   *Increase in pain   *Temperature over 101   *Increase in drainage from your wound or a foul odor   *Uncontrolled swelling   *Need for compression bandage changes due to slippage, breakthrough drainage    If you need medical attention outside

## 2024-05-30 ENCOUNTER — TELEPHONE (OUTPATIENT)
Dept: WOUND CARE | Age: 79
End: 2024-05-30

## 2024-06-12 ENCOUNTER — HOSPITAL ENCOUNTER (OUTPATIENT)
Dept: WOUND CARE | Age: 79
Discharge: HOME | End: 2024-06-12
Attending: INTERNAL MEDICINE
Payer: MEDICARE

## 2024-06-12 VITALS
OXYGEN SATURATION: 91 % | SYSTOLIC BLOOD PRESSURE: 161 MMHG | TEMPERATURE: 98.1 F | RESPIRATION RATE: 18 BRPM | DIASTOLIC BLOOD PRESSURE: 77 MMHG | HEART RATE: 81 BPM

## 2024-06-12 PROCEDURE — 99213 OFFICE O/P EST LOW 20 MIN: CPT

## 2024-06-12 ASSESSMENT — PAIN SCALES - GENERAL: PAINLEVEL_OUTOF10: 0

## 2024-06-12 NOTE — PLAN OF CARE
Problem: Wound:  Goal: Will show signs of wound healing; wound closure and no evidence of infection  Description: Will show signs of wound healing; wound closure and no evidence of infection  Outcome: Progressing     Care plan reviewed with patient and daughter.  Patient and daughter verbalizes understanding of the plan of care and contributes to goal setting.

## 2024-06-12 NOTE — PROGRESS NOTES
[facility]     will CHAVEZ Nicolaus  MEDICAL RECORD NUMBER:  [id number]  AGE: 78 y.o.   GENDER: female  : 1945  EPISODE DATE:  2024    Subjective:   Non healing scalp wound:     HISTORY OF PRESENT ILLNESS   She is a 78-year-old female patient who had excision of a skin cancer from the scalp over a year ago with wide surgical excision and has a nonhealing wound.  The wound failed to heal despite grafting and was referred to the wound clinic.moist dressing was applied  She has history of coronary artery disease. The wound is slowly improving.    PAST MEDICAL HISTORY                 Diagnosis Date    Arthritis     Asthma     CAD (coronary artery disease)     Deep vein thrombosis (DVT) ([facility])     Escherichia coli infection     H/O coagulation disorder 2014    HLD (hyperlipidemia)     HTN (hypertension)     Hx of blood clots     Joint pain     Morbid obesity ([facility])     NSTEMI (non-ST elevated myocardial infarction) ([facility]) 2014    Postprocedural non-healing wound     S/P cardiac catheterization: 2014: 99% distal RCA, 90% mid-LAD, 85% ostial diagonal, 60% LCx. 2014: 99% distal RCA, 90% mid-LAD, 85% ostial diagonal, 60% LCx. Dr. Wilson    Stroke ([facility])     Surgical wound, non healing 2024    Non healing scalp wound following wide excision of skin cancer. Has exposed skull    Swelling     Wheezing      PAST SURGICAL HISTORY     PAST SURGICAL HISTORY    Past Surgical History:   Procedure Laterality Date    ANKLE SURGERY      CARDIOVASCULAR STRESS TEST  2010    No obvious stress induced ischemia. EF 71%.     CORONARY ARTERY BYPASS GRAFT  -3-14     x4    DIAGNOSTIC CARDIAC CATH LAB PROCEDURE      EYE SURGERY      HYSTERECTOMY (CERVIX STATUS UNKNOWN)      TRANSTHORACIC ECHOCARDIOGRAM  2010    Normal LV size and systolic function. EF 55-65%. Mild TR.      FAMILY HISTORY         Family History   Problem Relation Age of Onset

## 2024-06-12 NOTE — PATIENT INSTRUCTIONS
Visit Discharge/Physician Orders:  - Mechanical Debridement was completed today by using a saline and gauze.   - Do not get your incision wet, wear a cap when you shower     Wound Location: scalp     Dressing orders:     1) Gather wound care supplies and arrange on clean table.     2) Wash your hands with soap and water or use alcohol based hand  for 20 seconds (sing \"Happy Birthday\" twice).    3) Cleanse wounds with normal saline or wound cleanser and gauze. Pat dry with clean gauze.    4) Scalp: Apply skin prep around the wound. Than apply collagen to the wound, then cover saline moistened guaze to wound (wound cleanser if unable to obtain sterile saline). Cover with silicone boarder foam dressing. Change daily.  Keep all dressings clean & dry.    Follow up visit:   3 Weeks Wednesday July 3rd @ 9:45am      We have sent your supply order to the following company:  [employer] Phone # [phone]   If you don't receive the items you were expecting or don't know what the items are that you received, call the company where the order was sent.   If you are unable to obtain wound supplies, continue to use the supplies you have available until you are able to reach us. It is most important to keep the wound covered at all times.  It is YOUR responsibility to make sure that supplies are re-ordered before you run out. Re-order telephone numbers are included in each package.     Keep next scheduled appointment. Please give 24 hour notice if unable to keep appointment. [phone]    If you experience any of the following, please call the Wound Care Service during business hours: Monday through Friday 8:00 am - 4:30 pm  ([phone]).   *Increase in pain   *Temperature over 101   *Increase in drainage from your wound or a foul odor   *Uncontrolled swelling   *Need for compression bandage changes due to slippage, breakthrough drainage    If you need medical attention outside of business hours, please contact your

## 2024-07-03 ENCOUNTER — HOSPITAL ENCOUNTER (OUTPATIENT)
Dept: WOUND CARE | Age: 79
Discharge: HOME | End: 2024-07-03
Attending: INTERNAL MEDICINE
Payer: MEDICARE

## 2024-07-03 VITALS
SYSTOLIC BLOOD PRESSURE: 170 MMHG | HEART RATE: 73 BPM | OXYGEN SATURATION: 97 % | DIASTOLIC BLOOD PRESSURE: 77 MMHG | TEMPERATURE: 98.3 F

## 2024-07-03 DIAGNOSIS — T81.89XA NON-HEALING SURGICAL WOUND, INITIAL ENCOUNTER: Primary | ICD-10-CM

## 2024-07-03 PROCEDURE — 99213 OFFICE O/P EST LOW 20 MIN: CPT

## 2024-07-03 NOTE — PROGRESS NOTES
Wound Care Supplies      Supply Company:     [employer] [address] [place] [address] f: [phone] f: [phone] p: [phone] [email or web address]      Ordering Center:   [facility]  [address] [address]  [phone]  WOUND CARE Dept: [phone]   FAX NUMBER [phone]    Patient Information:      Mendez Freeman  [address]  [place] [address]   [phone]   : 1945  AGE: 78 y.o.     GENDER: female   EPISODE DATE: 7/3/2024    Insurance:      PRIMARY INSURANCE:  [facility]  Coverage: [employer] MEDICARE  Effective Date: 2024  Group Number: [unfilled]  Subscriber Number: [id number] - (Medicare Managed)    Payer/Plan Subscr  Sex Relation Sub. Ins. ID Effective Group Num   1. [employer] MEDICA* MENDEZ FREEMAN* 1945 Female Self [id number]                                   [address] [address]       Patient Wound Information:      Problem List Items Addressed This Visit          Other    Surgical wound, non healing - Primary    Relevant Orders    OR NORMAL SALINE SOLUTION INFUS       WOUNDS REQUIRING DRESSING SUPPLIES:     Wound 24 Head (Active)   Wound Image   24 1000   Wound Etiology Non-Healing Surgical 24 1000   Dressing Status New dressing applied;Intact 24 1000   Wound Cleansed Cleansed with saline 24 1000   Dressing/Treatment Collagen;Moist to dry;Silicone border;Foam 24 1000   Offloading for Diabetic Foot Ulcers Offloading not required 24 1000   Wound Length (cm) 2.3 cm 24 1000   Wound Width (cm) 3.5 cm 24 1000   Wound Depth (cm) 0.2 cm 24 1000   Wound Surface Area (cm^2) 8.05 cm^2 24 1000   Change in Wound Size % (l*w) -7.33 24 1000   Wound Volume (cm^3) 1.61 cm^3 24 1000   Wound Healing % 28 24 1000   Wound Assessment Slough;Pale granulation tissue;Hyper granulation tissue 24 1000   Drainage Amount 
Offloading not required 07/03/24 1000   Wound Length (cm) 2.3 cm 07/03/24 1000   Wound Width (cm) 3.5 cm 07/03/24 1000   Wound Depth (cm) 0.2 cm 07/03/24 1000   Wound Surface Area (cm^2) 8.05 cm^2 07/03/24 1000   Change in Wound Size % (l*w) -7.33 07/03/24 1000   Wound Volume (cm^3) 1.61 cm^3 07/03/24 1000   Wound Healing % 28 07/03/24 1000   Wound Assessment Slough;Pale granulation tissue;Hyper granulation tissue 07/03/24 1000   Drainage Amount Moderate (25-50%) 07/03/24 1000   Drainage Description Serosanguinous;Yellow 07/03/24 1000   Odor None 07/03/24 1000   Carmen-wound Assessment Dry/flaky;Blanchable erythema 07/03/24 1000   Margins Attached edges 07/03/24 1000   Wound Thickness Description not for Pressure Injury Full thickness 07/03/24 1000   Number of days: 63         LABS    LABS     Lab Results   Component Value Date/Time    BC No growth-preliminary  No growth   11/13/2015 05:15 AM       Assessment:   Nonhealing postsurgical wound.  The wound is slowly improving. Will continue collagen dressing  Advised on moist wound and avoid drying        Patient Active Problem List   Diagnosis Code    CAD (coronary artery disease) I25.10    HTN (hypertension) I10    HLD (hyperlipidemia) E78.5    Deep vein thrombosis (DVT) ([facility]) I82.409    Asthma J45.909    Wheezing R06.2    Stroke ([facility]) I63.9    Swelling R60.9    Joint pain M25.50    Morbid obesity ([facility]) E66.01    Escherichia coli infection     NSTEMI (non-ST elevated myocardial infarction) ([facility]) I21.4    S/P cardiac catheterization: 5/30/2014: 99% distal RCA, 90% mid-LAD, 85% ostial diagonal, 60% LCx. Z98.890    H/O coagulation disorder Z86.2    Diverticulitis K57.92    SIRS (systemic inflammatory response syndrome) ([facility]) R65.10    Surgical wound, non healing T81.89XA        PLAN    Continue local wound care  Follow up in 2-3 wks         Electronically signed by Dm Garcia MD on 7/3/2024 at 9:52 AM

## 2024-07-03 NOTE — PLAN OF CARE
Problem: Wound:  Goal: Will show signs of wound healing; wound closure and no evidence of infection  Description: Will show signs of wound healing; wound closure and no evidence of infection  Outcome: Progressing   Patient seen in clinic today for scalp wound. No s/s of infection. See AVS. Follow up in 3 week/s. Care plan reviewed with patient and family. Patient and family verbalizes understanding of the plan of care and contributes to goal setting.

## 2024-07-03 NOTE — PATIENT INSTRUCTIONS
Visit Discharge/Physician Orders:  - supplies ordered today  - hair trimmed today  - Mechanical Debridement was completed today by using a saline and gauze.   - Do not get your incision wet, wear a cap when you shower     Wound Location: scalp     Dressing orders:     1) Gather wound care supplies and arrange on clean table.     2) Wash your hands with soap and water or use alcohol based hand  for 20 seconds (sing \"Happy Birthday\" twice).    3) Cleanse wounds with normal saline or wound cleanser and gauze. Pat dry with clean gauze.    4) Scalp: Apply skin prep around the wound. Than apply collagen to the wound, then cover saline moistened guaze to wound (wound cleanser if unable to obtain sterile saline). Cover with silicone boarder foam dressing. Change daily.  Keep all dressings clean & dry.    Follow up visit:   3 Weeks Wednesday July 24th @ 11:15am      We have sent your supply order to the following company:  [employer] Phone # [phone]   If you don't receive the items you were expecting or don't know what the items are that you received, call the company where the order was sent.   If you are unable to obtain wound supplies, continue to use the supplies you have available until you are able to reach us. It is most important to keep the wound covered at all times.  It is YOUR responsibility to make sure that supplies are re-ordered before you run out. Re-order telephone numbers are included in each package.     Keep next scheduled appointment. Please give 24 hour notice if unable to keep appointment. [phone]    If you experience any of the following, please call the Wound Care Service during business hours: Monday through Friday 8:00 am - 4:30 pm  ([phone]).   *Increase in pain   *Temperature over 101   *Increase in drainage from your wound or a foul odor   *Uncontrolled swelling   *Need for compression bandage changes due to slippage, breakthrough drainage    If you need medical attention

## 2024-07-24 ENCOUNTER — HOSPITAL ENCOUNTER (OUTPATIENT)
Dept: WOUND CARE | Age: 79
Discharge: HOME | End: 2024-07-24
Attending: INTERNAL MEDICINE
Payer: MEDICARE

## 2024-07-24 VITALS
HEART RATE: 75 BPM | SYSTOLIC BLOOD PRESSURE: 188 MMHG | OXYGEN SATURATION: 95 % | RESPIRATION RATE: 18 BRPM | TEMPERATURE: 98.2 F | DIASTOLIC BLOOD PRESSURE: 86 MMHG

## 2024-07-24 PROCEDURE — 99213 OFFICE O/P EST LOW 20 MIN: CPT

## 2024-07-24 NOTE — PLAN OF CARE
Problem: Wound:  Goal: Will show signs of wound healing; wound closure and no evidence of infection  Description: Will show signs of wound healing; wound closure and no evidence of infection  Outcome: Progressing   Seen today for scalp wound, see AVS for discharge   Care plan reviewed with patient and family.  Patient and family verbalize understanding of the plan of care and contribute to goal setting.      initiation of breastfeeding/breast milk feeding

## 2024-07-24 NOTE — PROGRESS NOTES
[facility]     will CHAVEZ [place]  MEDICAL RECORD NUMBER:  [id number]  AGE: 78 y.o.   GENDER: female  : 1945  EPISODE DATE:  2024    Subjective:   Non healing scalp wound:     HISTORY OF PRESENT ILLNESS   She is a 78-year-old female patient who had excision of a skin cancer from the scalp over a year ago with wide surgical excision and has a nonhealing wound.  The wound failed to heal despite grafting and was referred to the wound clinic.moist dressing was applied  She has history of coronary artery disease. The wound has been  slowly improving. She has still exposed bone  PAST MEDICAL HISTORY                 Diagnosis Date    Arthritis     Asthma     CAD (coronary artery disease)     Deep vein thrombosis (DVT) ([facility])     Escherichia coli infection     H pylori ulcer     H/O coagulation disorder 2014    HLD (hyperlipidemia)     HTN (hypertension)     Hx of blood clots     Joint pain     Morbid obesity ([facility])     NSTEMI (non-ST elevated myocardial infarction) ([facility]) 2014    Postprocedural non-healing wound     S/P cardiac catheterization: 2014: 99% distal RCA, 90% mid-LAD, 85% ostial diagonal, 60% LCx. 2014: 99% distal RCA, 90% mid-LAD, 85% ostial diagonal, 60% LCx. Dr. Wilson    Stroke ([facility])     Surgical wound, non healing 2024    Non healing scalp wound following wide excision of skin cancer. Has exposed skull    Swelling     Wheezing      PAST SURGICAL HISTORY     PAST SURGICAL HISTORY    Past Surgical History:   Procedure Laterality Date    ANKLE SURGERY      CARDIOVASCULAR STRESS TEST  2010    No obvious stress induced ischemia. EF 71%.     CORONARY ARTERY BYPASS GRAFT  -3-14     x4    DIAGNOSTIC CARDIAC CATH LAB PROCEDURE      EYE SURGERY      HYSTERECTOMY (CERVIX STATUS UNKNOWN)      TRANSTHORACIC ECHOCARDIOGRAM  2010    Normal LV size and systolic function. EF 55-65%. Mild TR.      FAMILY HISTORY 
  Odor None 07/24/24 1125   Carmen-wound Assessment Dry/flaky;Intact 07/24/24 1125   Margins Attached edges 07/24/24 1125   Wound Thickness Description not for Pressure Injury Full thickness 07/24/24 1125   Number of days: 84          Supplies Requested :        *DISPENSE AS WRITTEN*    WOUND #: 1   PRIMARY DRESSING:  Collagen    Cover and Secure with: Gauze pad  Other   Excel Daily SAP 4\" x 4\" - Dispense as written        FREQUENCY OF DRESSING CHANGES:  Daily         ADDITIONAL ITEMS:  [] Gloves Small  [x] Gloves Medium [] Gloves Large [] Gloves XLarge  [] Tape 1\" [x] Tape 2\" [] Tape 3\"  [] Medipore Tape  [x] Saline  [x] Skin Prep   [] Adhesive Remover   [] Cotton Tip Applicators   [] Other:    Patient Wound(s) Debrided: [x] Yes if yes please add date 7-24-24   [] No    Debribement Type: Mechanical     Patient currently being seen by Home Health: [] Yes   [x] No    Duration for needed supplies:  []15  [x]30  []60  []90 Days        Electronically signed by Bonnie Berumen RN on 7/24/2024 at 2:06 PM     Provider Information:      PROVIDER'S NAME: Dm Garcia MD    NPI: [id number]

## 2024-07-24 NOTE — PATIENT INSTRUCTIONS
Visit Discharge/Physician Orders:  - Mechanical Debridement was completed today by using a saline and gauze.   - Do not get your incision wet, wear a cap when you shower   -will send in for skin grafts    Wound Location: scalp     Dressing orders:     1) Gather wound care supplies and arrange on clean table.     2) Wash your hands with soap and water or use alcohol based hand  for 20 seconds (sing \"Happy Birthday\" twice).    3) Cleanse wounds with normal saline or wound cleanser and gauze. Pat dry with clean gauze.    4) Scalp: Apply skin prep around the wound. Than apply collagen to the wound, then cover saline moistened guaze to wound, guaze needs to be damp(wound cleanser if unable to obtain sterile saline). Cover with silicone boarder foam dressing. Change daily.  Keep all dressings clean & dry.    Follow up visit:   4 Weeks Wednesday August 21st at 11:00 am       We have sent your supply order to the following company:  [employer] Phone # [phone]   If you don't receive the items you were expecting or don't know what the items are that you received, call the company where the order was sent.   If you are unable to obtain wound supplies, continue to use the supplies you have available until you are able to reach us. It is most important to keep the wound covered at all times.  It is YOUR responsibility to make sure that supplies are re-ordered before you run out. Re-order telephone numbers are included in each package.     Keep next scheduled appointment. Please give 24 hour notice if unable to keep appointment. [phone]    If you experience any of the following, please call the Wound Care Service during business hours: Monday through Friday 8:00 am - 4:30 pm  ([phone]).   *Increase in pain   *Temperature over 101   *Increase in drainage from your wound or a foul odor   *Uncontrolled swelling   *Need for compression bandage changes due to slippage, breakthrough drainage    If you need medical

## 2024-08-06 ENCOUNTER — TELEPHONE (OUTPATIENT)
Dept: WOUND CARE | Age: 79
End: 2024-08-06

## 2024-08-06 NOTE — TELEPHONE ENCOUNTER
----- Message from Zoey Taveras sent at 8/6/2024  8:00 AM EDT -----   [phone]   [phone]    [facility] @ [facility] THEY NEED ADDITIONAL CLINICAL NOTES PENDING PRIOR AUTORIZATION FOR GRAFIX    NEED RECENT ALBUMIN OR PRE ALBUMIN LEVELS    
Faxed to [facility] and [facility] [employer] authorization as requested.   
No

## 2024-08-08 ENCOUNTER — TELEPHONE (OUTPATIENT)
Dept: WOUND CARE | Age: 79
End: 2024-08-08

## 2024-08-08 DIAGNOSIS — T81.89XD NON-HEALING SURGICAL WOUND, SUBSEQUENT ENCOUNTER: Primary | ICD-10-CM

## 2024-08-08 NOTE — TELEPHONE ENCOUNTER
----- Message from Zoey Taveras sent at 8/8/2024  2:19 PM EDT -----  [phone] ASHLY @ [facility] LEFT A VM THAT A PEER TO PEER NEEDED DONE BY 1 PM TODAY?

## 2024-08-08 NOTE — TELEPHONE ENCOUNTER
Kerecis will request an extension from patients insurance if denied they will send paperwork for an appeal of medical necessity.

## 2024-08-20 ENCOUNTER — HOSPITAL ENCOUNTER (OUTPATIENT)
Dept: ULTRASOUND IMAGING | Age: 79
Discharge: HOME | End: 2024-08-20
Payer: MEDICARE

## 2024-08-20 DIAGNOSIS — R11.0 NAUSEA: ICD-10-CM

## 2024-08-20 DIAGNOSIS — R10.10 UPPER ABDOMINAL PAIN: ICD-10-CM

## 2024-08-20 DIAGNOSIS — R19.7 DIARRHEA, UNSPECIFIED TYPE: ICD-10-CM

## 2024-08-20 PROCEDURE — 76705 ECHO EXAM OF ABDOMEN: CPT

## 2024-08-21 ENCOUNTER — HOSPITAL ENCOUNTER (OUTPATIENT)
Dept: WOUND CARE | Age: 79
Discharge: HOME | End: 2024-08-21
Attending: INTERNAL MEDICINE
Payer: MEDICARE

## 2024-08-21 VITALS
SYSTOLIC BLOOD PRESSURE: 167 MMHG | DIASTOLIC BLOOD PRESSURE: 76 MMHG | RESPIRATION RATE: 20 BRPM | TEMPERATURE: 98 F | OXYGEN SATURATION: 97 % | HEART RATE: 66 BPM

## 2024-08-21 PROCEDURE — 99213 OFFICE O/P EST LOW 20 MIN: CPT

## 2024-08-21 RX ORDER — SUCRALFATE 1 G/1
1 TABLET ORAL 3 TIMES DAILY
COMMUNITY

## 2024-08-21 RX ORDER — FAMOTIDINE 40 MG/1
40 TABLET, FILM COATED ORAL DAILY
COMMUNITY

## 2024-08-21 NOTE — PLAN OF CARE
Problem: Wound:  Goal: Will show signs of wound healing; wound closure and no evidence of infection  Description: Will show signs of wound healing; wound closure and no evidence of infection  Outcome: Progressing     Patient seen at the wound clinic today for evaluation of scalp wound, please see AVS. Care plan reviewed with patient and Yary neff.  Patient and Yary verbalize understanding of the plan of care and contribute to goal setting.

## 2024-08-21 NOTE — PROGRESS NOTES
Wound Care Supplies      Supply Company:     [employer] [address] [place] [address] f: [phone] f: [phone] p: [phone] [email or web address]      Ordering Center:   [facility]  [address] [address]  [phone]  WOUND CARE Dept: [phone]   FAX NUMBER [phone]    Patient Information:      Mendez Freeman  [address]  [place] [address]   [phone]   : 1945  AGE: 78 y.o.     GENDER: female   EPISODE DATE: 2024    Insurance:      PRIMARY INSURANCE:  Plan: [employer] MEDICARE  Coverage: [employer] MEDICARE  Effective Date: 2024  Group Number: [unfilled]  Subscriber Number: [id number] - (Medicare Managed)    Payer/Plan Subscr  Sex Relation Sub. Ins. ID Effective Group Num   1. [employer] MEDICA* MENDEZ FREEMAN* 1945 Female Self [id number]                                   [address] [address]       Patient Wound Information:      Problem List Items Addressed This Visit    None      WOUNDS REQUIRING DRESSING SUPPLIES:     Wound 24 Head (Active)   Wound Image   24 1117   Wound Etiology Non-Healing Surgical 24 1117   Dressing Status New dressing applied;Intact 24 1117   Wound Cleansed Cleansed with saline 24 1117   Dressing/Treatment Collagen;ABD;Tape/Soft cloth adhesive tape 24 1117   Offloading for Diabetic Foot Ulcers Offloading not required 24 1117   Wound Length (cm) 4.8 cm 24 1117   Wound Width (cm) 4.2 cm 24 1117   Wound Depth (cm) 0.05 cm 24 1117   Wound Surface Area (cm^2) 20.16 cm^2 24 1117   Change in Wound Size % (l*w) -168.8 24 1117   Wound Volume (cm^3) 1.008 cm^3 24 1117   Wound Healing % 55 24 1117   Wound Assessment Epithelialization;Slough;Pale granulation tissue;Bleeding 24   Drainage Amount Moderate (25-50%) 24   Drainage Description Serosanguinous 24   Odor None

## 2024-08-21 NOTE — PATIENT INSTRUCTIONS
Visit Discharge/Physician Orders:  - Mechanical Debridement was completed today by using a saline and gauze.   - Do not get your incision wet, wear a cap when you shower   - Will resubmit for Grafix to your insurance.     Wound Location: scalp     Dressing orders:     1) Gather wound care supplies and arrange on clean table.     2) Wash your hands with soap and water or use alcohol based hand  for 20 seconds (sing \"Happy Birthday\" twice).    3) Cleanse wounds with normal saline or wound cleanser and gauze. Pat dry with clean gauze.    4) Scalp: Apply skin prep around the wound. Than apply collagen to the wound, moisten with saline,  Cover with ABD pad, secure lightly with tape. Change daily.  Keep all dressings clean & dry.    Follow up visit:   4 Weeks Wednesday September 25th @ 11 am       We have sent your supply order to the following company:  [employer] Phone # [phone]   If you don't receive the items you were expecting or don't know what the items are that you received, call the company where the order was sent.   If you are unable to obtain wound supplies, continue to use the supplies you have available until you are able to reach us. It is most important to keep the wound covered at all times.  It is YOUR responsibility to make sure that supplies are re-ordered before you run out. Re-order telephone numbers are included in each package.     Keep next scheduled appointment. Please give 24 hour notice if unable to keep appointment. [phone]    If you experience any of the following, please call the Wound Care Service during business hours: Monday through Friday 8:00 am - 4:30 pm  ([phone]).   *Increase in pain   *Temperature over 101   *Increase in drainage from your wound or a foul odor   *Uncontrolled swelling   *Need for compression bandage changes due to slippage, breakthrough drainage    If you need medical attention outside of business hours, please contact your Primary Care Doctor

## 2024-08-21 NOTE — PROGRESS NOTES
[facility]     will CHAVEZ [place]  MEDICAL RECORD NUMBER:  [id number]  AGE: 78 y.o.   GENDER: female  : 1945  EPISODE DATE:  2024    Subjective:   Non healing scalp wound:     HISTORY OF PRESENT ILLNESS   She is a 78-year-old female patient who had excision of a skin cancer from the scalp over a year ago with wide surgical excision and has a nonhealing wound.  The wound failed to heal despite grafting and was referred to the wound clinic.we have been applying collagen and moist dressing.  She still has exposed skull and her insurance has declined to authorize Kerecis.  We have been trying to get Grafix to help close the wound.  She still has open wound has occasional pain and she still has exposed bone.  PAST MEDICAL HISTORY                 Diagnosis Date    Arthritis     Asthma     CAD (coronary artery disease)     Deep vein thrombosis (DVT) ([facility])     Escherichia coli infection     H pylori ulcer     H/O coagulation disorder 2014    HLD (hyperlipidemia)     HTN (hypertension)     Hx of blood clots     Joint pain     Morbid obesity ([facility])     NSTEMI (non-ST elevated myocardial infarction) ([facility]) 2014    Postprocedural non-healing wound     S/P cardiac catheterization: 2014: 99% distal RCA, 90% mid-LAD, 85% ostial diagonal, 60% LCx. 2014: 99% distal RCA, 90% mid-LAD, 85% ostial diagonal, 60% LCx. Dr. Wilson    Stroke ([facility])     Surgical wound, non healing 2024    Non healing scalp wound following wide excision of skin cancer. Has exposed skull    Swelling     Wheezing      PAST SURGICAL HISTORY     PAST SURGICAL HISTORY    Past Surgical History:   Procedure Laterality Date    ANKLE SURGERY      CARDIOVASCULAR STRESS TEST  2010    No obvious stress induced ischemia. EF 71%.     CORONARY ARTERY BYPASS GRAFT  6-3-14     x4    DIAGNOSTIC CARDIAC CATH LAB PROCEDURE      EYE SURGERY      HYSTERECTOMY (CERVIX STATUS UNKNOWN)

## 2024-08-28 ENCOUNTER — TELEPHONE (OUTPATIENT)
Dept: WOUND CARE | Age: 79
End: 2024-08-28

## 2024-08-28 DIAGNOSIS — T81.89XD NON-HEALING SURGICAL WOUND, SUBSEQUENT ENCOUNTER: Primary | ICD-10-CM

## 2024-08-28 NOTE — TELEPHONE ENCOUNTER
----- Message from Zoey MADSEN sent at 8/28/2024 11:12 AM EDT -----   [phone] MENDEZ STATES THAT THE COLLAGEN IS NOT MELTING INTO THE WOUND LIKE IT USED TO DO, AND WHEN REMOVING IT, CAUSES BLEEDING

## 2024-09-17 ENCOUNTER — TELEPHONE (OUTPATIENT)
Dept: WOUND CARE | Age: 79
End: 2024-09-17

## 2024-09-25 ENCOUNTER — HOSPITAL ENCOUNTER (OUTPATIENT)
Dept: WOUND CARE | Age: 79
Discharge: HOME | End: 2024-09-25
Attending: INTERNAL MEDICINE
Payer: MEDICARE

## 2024-09-25 VITALS
SYSTOLIC BLOOD PRESSURE: 166 MMHG | OXYGEN SATURATION: 96 % | RESPIRATION RATE: 20 BRPM | TEMPERATURE: 98.4 F | HEART RATE: 70 BPM | DIASTOLIC BLOOD PRESSURE: 73 MMHG

## 2024-09-25 DIAGNOSIS — T81.89XD NON-HEALING SURGICAL WOUND, SUBSEQUENT ENCOUNTER: Primary | ICD-10-CM

## 2024-09-25 PROCEDURE — 99213 OFFICE O/P EST LOW 20 MIN: CPT

## 2024-09-25 RX ORDER — LIDOCAINE HYDROCHLORIDE 20 MG/ML
JELLY TOPICAL ONCE
Status: CANCELLED | OUTPATIENT
Start: 2024-09-25 | End: 2024-09-25

## 2024-09-25 RX ORDER — LIDOCAINE HYDROCHLORIDE 20 MG/ML
JELLY TOPICAL ONCE
OUTPATIENT
Start: 2024-09-25 | End: 2024-09-25

## 2024-09-25 RX ORDER — BETAMETHASONE DIPROPIONATE 0.5 MG/G
CREAM TOPICAL ONCE
OUTPATIENT
Start: 2024-09-25 | End: 2024-09-25

## 2024-09-25 RX ORDER — LIDOCAINE HYDROCHLORIDE 40 MG/ML
SOLUTION TOPICAL ONCE
OUTPATIENT
Start: 2024-09-25 | End: 2024-09-25

## 2024-09-25 RX ORDER — TRIAMCINOLONE ACETONIDE 1 MG/G
OINTMENT TOPICAL ONCE
Status: CANCELLED | OUTPATIENT
Start: 2024-09-25 | End: 2024-09-25

## 2024-09-25 RX ORDER — GINSENG 100 MG
CAPSULE ORAL ONCE
Status: CANCELLED | OUTPATIENT
Start: 2024-09-25 | End: 2024-09-25

## 2024-09-25 RX ORDER — LIDOCAINE 40 MG/G
CREAM TOPICAL ONCE
Status: CANCELLED | OUTPATIENT
Start: 2024-09-25 | End: 2024-09-25

## 2024-09-25 RX ORDER — NEOMYCIN/BACITRACIN/POLYMYXINB 3.5-400-5K
OINTMENT (GRAM) TOPICAL ONCE
Status: CANCELLED | OUTPATIENT
Start: 2024-09-25 | End: 2024-09-25

## 2024-09-25 RX ORDER — LIDOCAINE 50 MG/G
OINTMENT TOPICAL ONCE
Status: CANCELLED | OUTPATIENT
Start: 2024-09-25 | End: 2024-09-25

## 2024-09-25 RX ORDER — MUPIROCIN 20 MG/G
OINTMENT TOPICAL ONCE
OUTPATIENT
Start: 2024-09-25 | End: 2024-09-25

## 2024-09-25 RX ORDER — CLOBETASOL PROPIONATE 0.5 MG/G
OINTMENT TOPICAL ONCE
Status: CANCELLED | OUTPATIENT
Start: 2024-09-25 | End: 2024-09-25

## 2024-09-25 RX ORDER — GENTAMICIN SULFATE 1 MG/G
OINTMENT TOPICAL ONCE
Status: CANCELLED | OUTPATIENT
Start: 2024-09-25 | End: 2024-09-25

## 2024-09-25 RX ORDER — BACITRACIN ZINC AND POLYMYXIN B SULFATE 500; 1000 [USP'U]/G; [USP'U]/G
OINTMENT TOPICAL ONCE
OUTPATIENT
Start: 2024-09-25 | End: 2024-09-25

## 2024-09-25 RX ORDER — GINSENG 100 MG
CAPSULE ORAL ONCE
OUTPATIENT
Start: 2024-09-25 | End: 2024-09-25

## 2024-09-25 RX ORDER — LIDOCAINE HYDROCHLORIDE 40 MG/ML
SOLUTION TOPICAL ONCE
Status: CANCELLED | OUTPATIENT
Start: 2024-09-25 | End: 2024-09-25

## 2024-09-25 RX ORDER — MUPIROCIN 20 MG/G
OINTMENT TOPICAL ONCE
Status: CANCELLED | OUTPATIENT
Start: 2024-09-25 | End: 2024-09-25

## 2024-09-25 RX ORDER — BETAMETHASONE DIPROPIONATE 0.5 MG/G
CREAM TOPICAL ONCE
Status: CANCELLED | OUTPATIENT
Start: 2024-09-25 | End: 2024-09-25

## 2024-09-25 RX ORDER — NEOMYCIN/BACITRACIN/POLYMYXINB 3.5-400-5K
OINTMENT (GRAM) TOPICAL ONCE
OUTPATIENT
Start: 2024-09-25 | End: 2024-09-25

## 2024-09-25 RX ORDER — CLOBETASOL PROPIONATE 0.5 MG/G
OINTMENT TOPICAL ONCE
OUTPATIENT
Start: 2024-09-25 | End: 2024-09-25

## 2024-09-25 RX ORDER — LIDOCAINE 40 MG/G
CREAM TOPICAL ONCE
OUTPATIENT
Start: 2024-09-25 | End: 2024-09-25

## 2024-09-25 RX ORDER — SODIUM CHLOR/HYPOCHLOROUS ACID 0.033 %
SOLUTION, IRRIGATION IRRIGATION ONCE
Status: CANCELLED | OUTPATIENT
Start: 2024-09-25 | End: 2024-09-25

## 2024-09-25 RX ORDER — SODIUM CHLOR/HYPOCHLOROUS ACID 0.033 %
SOLUTION, IRRIGATION IRRIGATION ONCE
OUTPATIENT
Start: 2024-09-25 | End: 2024-09-25

## 2024-09-25 RX ORDER — LIDOCAINE 50 MG/G
OINTMENT TOPICAL ONCE
OUTPATIENT
Start: 2024-09-25 | End: 2024-09-25

## 2024-09-25 RX ORDER — GENTAMICIN SULFATE 1 MG/G
OINTMENT TOPICAL ONCE
OUTPATIENT
Start: 2024-09-25 | End: 2024-09-25

## 2024-09-25 RX ORDER — TRIAMCINOLONE ACETONIDE 1 MG/G
OINTMENT TOPICAL ONCE
OUTPATIENT
Start: 2024-09-25 | End: 2024-09-25

## 2024-09-25 RX ORDER — BACITRACIN ZINC AND POLYMYXIN B SULFATE 500; 1000 [USP'U]/G; [USP'U]/G
OINTMENT TOPICAL ONCE
Status: CANCELLED | OUTPATIENT
Start: 2024-09-25 | End: 2024-09-25

## 2024-09-25 ASSESSMENT — PAIN DESCRIPTION - DESCRIPTORS: DESCRIPTORS: SORE

## 2024-09-25 ASSESSMENT — PAIN SCALES - GENERAL: PAINLEVEL_OUTOF10: 1

## 2024-09-25 ASSESSMENT — PAIN DESCRIPTION - LOCATION: LOCATION: HEAD

## 2024-09-30 ENCOUNTER — TELEPHONE (OUTPATIENT)
Dept: WOUND CARE | Age: 79
End: 2024-09-30

## 2024-09-30 NOTE — TELEPHONE ENCOUNTER
Called [employer] regarding patients supply issue and they said that was an error and they are shipping out the rest of the collagen powder packets now. Called and updated patient of this and instructed her to call back if they are not received within the next couple days.

## 2024-09-30 NOTE — TELEPHONE ENCOUNTER
----- Message from Zoey MADSEN sent at 9/30/2024  9:25 AM EDT -----   [phone] MENDEZ SAYS HER COLLAGEN WAS CHANGED TO POWDER, BUT SHE ONLY GOT ONE PACK IN THIS SUPPLY SHIPMENT?

## 2024-10-03 ENCOUNTER — OFFICE VISIT (OUTPATIENT)
Dept: CARDIOLOGY CLINIC | Age: 79
End: 2024-10-03
Payer: MEDICARE

## 2024-10-03 VITALS
WEIGHT: 202 LBS | DIASTOLIC BLOOD PRESSURE: 68 MMHG | HEIGHT: 62 IN | BODY MASS INDEX: 37.17 KG/M2 | SYSTOLIC BLOOD PRESSURE: 132 MMHG | HEART RATE: 84 BPM

## 2024-10-03 DIAGNOSIS — I10 PRIMARY HYPERTENSION: ICD-10-CM

## 2024-10-03 DIAGNOSIS — I25.810 CORONARY ARTERY DISEASE INVOLVING CORONARY BYPASS GRAFT OF NATIVE HEART WITHOUT ANGINA PECTORIS: Primary | ICD-10-CM

## 2024-10-03 DIAGNOSIS — E78.01 FAMILIAL HYPERCHOLESTEROLEMIA: ICD-10-CM

## 2024-10-03 PROCEDURE — 3078F DIAST BP <80 MM HG: CPT | Performed by: NUCLEAR MEDICINE

## 2024-10-03 PROCEDURE — 99213 OFFICE O/P EST LOW 20 MIN: CPT | Performed by: NUCLEAR MEDICINE

## 2024-10-03 PROCEDURE — G8428 CUR MEDS NOT DOCUMENT: HCPCS | Performed by: NUCLEAR MEDICINE

## 2024-10-03 PROCEDURE — 93000 ELECTROCARDIOGRAM COMPLETE: CPT | Performed by: NUCLEAR MEDICINE

## 2024-10-03 PROCEDURE — 1036F TOBACCO NON-USER: CPT | Performed by: NUCLEAR MEDICINE

## 2024-10-03 PROCEDURE — G8484 FLU IMMUNIZE NO ADMIN: HCPCS | Performed by: NUCLEAR MEDICINE

## 2024-10-03 PROCEDURE — G8400 PT W/DXA NO RESULTS DOC: HCPCS | Performed by: NUCLEAR MEDICINE

## 2024-10-03 PROCEDURE — 3075F SYST BP GE 130 - 139MM HG: CPT | Performed by: NUCLEAR MEDICINE

## 2024-10-03 PROCEDURE — 1090F PRES/ABSN URINE INCON ASSESS: CPT | Performed by: NUCLEAR MEDICINE

## 2024-10-03 PROCEDURE — 1123F ACP DISCUSS/DSCN MKR DOCD: CPT | Performed by: NUCLEAR MEDICINE

## 2024-10-03 PROCEDURE — G8417 CALC BMI ABV UP PARAM F/U: HCPCS | Performed by: NUCLEAR MEDICINE

## 2024-10-03 RX ORDER — BUMETANIDE 1 MG/1
1 TABLET ORAL DAILY
COMMUNITY

## 2024-10-03 NOTE — PROGRESS NOTES
[facility] [place] [facility]  [facility]ARD[facility]  [address]  [address]  [facility] [address]  Dept: [phone]  Dept Fax: [phone]  Loc: [phone]    Visit Date: 10/3/2024    Radha Freeman is a 78 y.o. female who presents todayfor:  Chief Complaint   Patient presents with    Follow-up    Hypertension    Hyperlipidemia    Coronary Artery Disease   Stressed after her  death   Known CABG   No chest pain   Some baseline dyspnea  No changes in breathing  BP is stable  No dizziness  No syncope  On statins for hyperlipidemia  No issues with the meds       HPI:  HPI  Past Medical History:   Diagnosis Date    Arthritis     Asthma     CAD (coronary artery disease)     Deep vein thrombosis (DVT) ([facility])     Escherichia coli infection     H pylori ulcer     H/O coagulation disorder 05/30/2014    HLD (hyperlipidemia)     HTN (hypertension)     Hx of blood clots     Joint pain     Morbid obesity     NSTEMI (non-ST elevated myocardial infarction) ([facility]) 05/30/2014    Postprocedural non-healing wound     S/P cardiac catheterization: 5/30/2014: 99% distal RCA, 90% mid-LAD, 85% ostial diagonal, 60% LCx. 05/30/2014 5/30/2014: 99% distal RCA, 90% mid-LAD, 85% ostial diagonal, 60% LCx. Dr. Wilson    Stroke ([facility])     Surgical wound, non healing 05/01/2024    Non healing scalp wound following wide excision of skin cancer. Has exposed skull    Swelling     Wheezing       Past Surgical History:   Procedure Laterality Date    ANKLE SURGERY  1990    CARDIOVASCULAR STRESS TEST  4 22 2010    No obvious stress induced ischemia. EF 71%.     CORONARY ARTERY BYPASS GRAFT  6-3-14     x4    DIAGNOSTIC CARDIAC CATH LAB PROCEDURE      EYE SURGERY      HYSTERECTOMY (CERVIX STATUS UNKNOWN)  1972    TRANSTHORACIC ECHOCARDIOGRAM  4 22 2010    Normal LV size and systolic function. EF 55-65%. Mild TR.      Family History   Problem Relation Age of Onset    Heart Disease Mother     Diabetes Mother     Heart

## 2024-10-04 ENCOUNTER — TELEPHONE (OUTPATIENT)
Dept: WOUND CARE | Age: 79
End: 2024-10-04

## 2024-10-04 NOTE — TELEPHONE ENCOUNTER
Spoke with [facility], they never sent the collagen on 9-30-24. They had no notes from this call. Told her patient only received 1 powder collagen packet in her order and she needs more. Person states she will overnight the collagen to the patient. Updated patient with this and offered her to come in and  collagen powder from office to get her through. Patient states she will just use the collagen sheet she has at home until she gets the powder.

## 2024-10-04 NOTE — TELEPHONE ENCOUNTER
----- Message from Zoey MADSEN sent at 10/4/2024 10:01 AM EDT -----   [phone] STILL NO COLLAGEN. WHAT SHOULD SHE USE?

## 2024-10-23 ENCOUNTER — HOSPITAL ENCOUNTER (OUTPATIENT)
Dept: WOUND CARE | Age: 79
Discharge: HOME | End: 2024-10-23
Attending: INTERNAL MEDICINE
Payer: MEDICARE

## 2024-10-23 VITALS
DIASTOLIC BLOOD PRESSURE: 80 MMHG | OXYGEN SATURATION: 97 % | TEMPERATURE: 97.6 F | SYSTOLIC BLOOD PRESSURE: 173 MMHG | RESPIRATION RATE: 20 BRPM | HEART RATE: 72 BPM

## 2024-10-23 DIAGNOSIS — T81.89XD NON-HEALING SURGICAL WOUND, SUBSEQUENT ENCOUNTER: Primary | ICD-10-CM

## 2024-10-23 PROCEDURE — 99213 OFFICE O/P EST LOW 20 MIN: CPT

## 2024-10-23 RX ORDER — LIDOCAINE 50 MG/G
OINTMENT TOPICAL ONCE
OUTPATIENT
Start: 2024-10-23 | End: 2024-10-23

## 2024-10-23 RX ORDER — BACITRACIN ZINC AND POLYMYXIN B SULFATE 500; 1000 [USP'U]/G; [USP'U]/G
OINTMENT TOPICAL ONCE
OUTPATIENT
Start: 2024-10-23 | End: 2024-10-23

## 2024-10-23 RX ORDER — LIDOCAINE HYDROCHLORIDE 20 MG/ML
JELLY TOPICAL ONCE
OUTPATIENT
Start: 2024-10-23 | End: 2024-10-23

## 2024-10-23 RX ORDER — LIDOCAINE 40 MG/G
CREAM TOPICAL ONCE
OUTPATIENT
Start: 2024-10-23 | End: 2024-10-23

## 2024-10-23 RX ORDER — SODIUM CHLOR/HYPOCHLOROUS ACID 0.033 %
SOLUTION, IRRIGATION IRRIGATION ONCE
OUTPATIENT
Start: 2024-10-23 | End: 2024-10-23

## 2024-10-23 RX ORDER — CLOBETASOL PROPIONATE 0.5 MG/G
OINTMENT TOPICAL ONCE
OUTPATIENT
Start: 2024-10-23 | End: 2024-10-23

## 2024-10-23 RX ORDER — GINSENG 100 MG
CAPSULE ORAL ONCE
OUTPATIENT
Start: 2024-10-23 | End: 2024-10-23

## 2024-10-23 RX ORDER — NEOMYCIN/BACITRACIN/POLYMYXINB 3.5-400-5K
OINTMENT (GRAM) TOPICAL ONCE
OUTPATIENT
Start: 2024-10-23 | End: 2024-10-23

## 2024-10-23 RX ORDER — BETAMETHASONE DIPROPIONATE 0.5 MG/G
CREAM TOPICAL ONCE
OUTPATIENT
Start: 2024-10-23 | End: 2024-10-23

## 2024-10-23 RX ORDER — TRIAMCINOLONE ACETONIDE 1 MG/G
OINTMENT TOPICAL ONCE
OUTPATIENT
Start: 2024-10-23 | End: 2024-10-23

## 2024-10-23 RX ORDER — MUPIROCIN 20 MG/G
OINTMENT TOPICAL ONCE
OUTPATIENT
Start: 2024-10-23 | End: 2024-10-23

## 2024-10-23 RX ORDER — GENTAMICIN SULFATE 1 MG/G
OINTMENT TOPICAL ONCE
OUTPATIENT
Start: 2024-10-23 | End: 2024-10-23

## 2024-10-23 RX ORDER — LIDOCAINE HYDROCHLORIDE 40 MG/ML
SOLUTION TOPICAL ONCE
OUTPATIENT
Start: 2024-10-23 | End: 2024-10-23

## 2024-10-23 NOTE — PROGRESS NOTES
infarction) ([facility]) I21.4    S/P cardiac catheterization: 5/30/2014: 99% distal RCA, 90% mid-LAD, 85% ostial diagonal, 60% LCx. Z98.890    H/O coagulation disorder Z86.2    Diverticulitis K57.92    SIRS (systemic inflammatory response syndrome) ([facility]) R65.10    Surgical wound, non healing T81.89XA        PLAN    Continue local wound care  Collagen powder to the open wound on the scalp  Follow up in 4wks         Electronically signed by Dm Garcia MD on 10/23/2024 at 11:07 AM

## 2024-10-23 NOTE — PLAN OF CARE
Problem: Wound:  Goal: Will show signs of wound healing; wound closure and no evidence of infection  Description: Will show signs of wound healing; wound closure and no evidence of infection  Outcome: Progressing     Patient presents to wound clinic for scalp wound. No s/s of infection noted. See AVS for discharge instructions. Follow up visit:   4 Weeks Wednesday November 20th at 10:45 am     Care plan reviewed with patient.  Patient verbalize understanding of the plan of care and contribute to goal setting.

## 2024-10-23 NOTE — PATIENT INSTRUCTIONS
Visit Discharge/Physician Orders:  - Grafix authorization sent to insurance today.   - Do not get your incision wet, wear a cap when you shower     Wound Location: scalp     Dressing orders:     1) Gather wound care supplies and arrange on clean table.     2) Wash your hands with soap and water or use alcohol based hand  for 20 seconds (sing \"Happy Birthday\" twice).    3) Cleanse wounds with normal saline or wound cleanser and gauze. Pat dry with clean gauze.    4) Scalp: Apply skin prep around the wound. Than apply collagen powder to the wound, moisten with saline,  Cover with ABD pad, secure lightly with tape. Change daily.  Keep all dressings clean & dry.    Follow up visit:   4 Weeks Wednesday November 20th at 10:45 am       We have sent your supply order to the following company:  [employer] Phone # [phone]   If you don't receive the items you were expecting or don't know what the items are that you received, call the company where the order was sent.   If you are unable to obtain wound supplies, continue to use the supplies you have available until you are able to reach us. It is most important to keep the wound covered at all times.  It is YOUR responsibility to make sure that supplies are re-ordered before you run out. Re-order telephone numbers are included in each package.     Keep next scheduled appointment. Please give 24 hour notice if unable to keep appointment. [phone]    If you experience any of the following, please call the Wound Care Service during business hours: Monday through Friday 8:00 am - 4:30 pm  ([phone]).   *Increase in pain   *Temperature over 101   *Increase in drainage from your wound or a foul odor   *Uncontrolled swelling   *Need for compression bandage changes due to slippage, breakthrough drainage    If you need medical attention outside of business hours, please contact your Primary Care Doctor or go to the nearest emergency room.

## 2024-10-28 ENCOUNTER — TELEPHONE (OUTPATIENT)
Dept: WOUND CARE | Age: 79
End: 2024-10-28

## 2024-10-28 NOTE — TELEPHONE ENCOUNTER
----- Message from Zoey MADSEN sent at 10/28/2024  7:55 AM EDT -----   [phone] [facility]AT[facility] AT [facility] REQUESTING LAST 30 DAYS NOTES WITH WOUND MEASUREMENTS AND POC. ALSO NEED MOST RECENT ALBUMIN RESULTS.  FAXED OVER THE REQUEST. CAN FAX BACK [phone]

## 2024-10-30 ENCOUNTER — TELEPHONE (OUTPATIENT)
Dept: WOUND CARE | Age: 79
End: 2024-10-30

## 2024-10-30 NOTE — TELEPHONE ENCOUNTER
At the last visit, orders were to still change dressing once a day. Contacted [facility] pharmacy who states that patient told them that she was supposed to change the dressing twice a day. Message sent to Dr. Garcia for clarification via perfect serve, awaiting returned message.

## 2024-10-30 NOTE — TELEPHONE ENCOUNTER
----- Message from Zoey MADSEN sent at 10/30/2024 12:22 PM EDT -----   [phone] [facility] CALLED TO REQUEST THAT A NEW ESCRIPT BE SENT FOR MENDEZ, SHE IS NOW USING SALINE 2 X DAY AND INSURANCE WILL NOT PAY FOR MORE UNLESS PRESCRIBED. CURRENT SCRIPT IS FOR 1 X DAY

## 2024-10-31 ENCOUNTER — TELEPHONE (OUTPATIENT)
Dept: WOUND CARE | Age: 79
End: 2024-10-31

## 2024-10-31 NOTE — TELEPHONE ENCOUNTER
Dr. Garcia states will be unable to order twice a day due to national saline shortage. [facility] pharmacy updated.

## 2024-10-31 NOTE — TELEPHONE ENCOUNTER
----- Message from Zoey MADSEN sent at 10/30/2024 12:22 PM EDT -----   [phone] [facility] CALLED TO REQUEST THAT A NEW ESCRIPT BE SENT FOR MENDEZ, SHE IS NOW USING SALINE 2 X DAY AND INSURANCE WILL NOT PAY FOR MORE UNLESS PRESCRIBED. CURRENT SCRIPT IS FOR 1 X DAY

## 2024-11-06 ENCOUNTER — TELEPHONE (OUTPATIENT)
Dept: WOUND CARE | Age: 79
End: 2024-11-06

## 2024-11-06 NOTE — TELEPHONE ENCOUNTER
----- Message from Zoey MADSEN sent at 11/6/2024 11:03 AM EST -----  [phone] STATES ONLY HAS 4 PK OF COLLAGEN LEFT, APPT NOT UNTIL 11/20, WILL NEED MORE

## 2024-11-06 NOTE — TELEPHONE ENCOUNTER
Wound Care Supplies      Supply Company:     [employer] [address] [place] [address] f: [phone] f: [phone] p: [phone] [email or web address]      Ordering Center:   [facility]  [address] [place] [address]  [phone]  WOUND CARE Dept: [phone]   FAX NUMBER [phone]    Patient Information:      Mendez Freeman  [address]  [place] [address]   [phone]   : 1945  AGE: 78 y.o.     GENDER: female   EPISODE DATE: 2024    Insurance:      PRIMARY INSURANCE:  Plan:   Coverage:   Effective Date:   Group Number: [unfilled]  Subscriber Number: [unfilled]    Payer/Plan Subscr  Sex Relation Sub. Ins. ID Effective Group Num   1. [employer]* MENDEZ FREEMAN* 1945 Female Self [id number]                                   [address] [address]       Patient Wound Information:      Problem List Items Addressed This Visit    None      WOUNDS REQUIRING DRESSING SUPPLIES:     @LDAREF(16:0:5)@  @LDAREF(30:0:5)@    Supplies Requested :        *DISPENSE AS WRITTEN*    WOUND #: 1   PRIMARY DRESSING:  Other: Collagen Powder   Cover and Secure with: ABD pad     FREQUENCY OF DRESSING CHANGES:  Daily       ADDITIONAL ITEMS:  [] Gloves Small  [] Gloves Medium [] Gloves Large [] Gloves XLarge  [] Tape 1\" [x] Tape 2\" [] Tape 3\"  [x] Medipore Tape  [x] Saline  [x] Skin Prep   [] Adhesive Remover   [] Cotton Tip Applicators   [] Other:    Patient Wound(s) Debrided: [x] Yes if yes please add date 10/23/24   [] No    Debribement Type: Mechanical     Patient currently being seen by Home Health: [] Yes   [x] No    Duration for needed supplies:  []15  [x]30  []60  []90 Days        Electronically signed by Valerie Duff RN on 2024 at 11:37 AM     Provider Information:      PROVIDER'S NAME: Dm Garcia MD    NPI: [id number]

## 2024-12-11 ENCOUNTER — HOSPITAL ENCOUNTER (OUTPATIENT)
Dept: WOUND CARE | Age: 79
Discharge: HOME | End: 2024-12-11
Attending: INTERNAL MEDICINE
Payer: MEDICARE

## 2024-12-11 VITALS
TEMPERATURE: 98.6 F | OXYGEN SATURATION: 97 % | SYSTOLIC BLOOD PRESSURE: 174 MMHG | DIASTOLIC BLOOD PRESSURE: 75 MMHG | HEART RATE: 77 BPM | RESPIRATION RATE: 18 BRPM

## 2024-12-11 DIAGNOSIS — T81.89XD NON-HEALING SURGICAL WOUND, SUBSEQUENT ENCOUNTER: Primary | ICD-10-CM

## 2024-12-11 PROCEDURE — 99213 OFFICE O/P EST LOW 20 MIN: CPT

## 2024-12-11 RX ORDER — MUPIROCIN 20 MG/G
OINTMENT TOPICAL ONCE
OUTPATIENT
Start: 2024-12-11 | End: 2024-12-11

## 2024-12-11 RX ORDER — TRIAMCINOLONE ACETONIDE 1 MG/G
OINTMENT TOPICAL ONCE
OUTPATIENT
Start: 2024-12-11 | End: 2024-12-11

## 2024-12-11 RX ORDER — GINSENG 100 MG
CAPSULE ORAL ONCE
OUTPATIENT
Start: 2024-12-11 | End: 2024-12-11

## 2024-12-11 RX ORDER — CLOBETASOL PROPIONATE 0.5 MG/G
OINTMENT TOPICAL ONCE
OUTPATIENT
Start: 2024-12-11 | End: 2024-12-11

## 2024-12-11 RX ORDER — NEOMYCIN/BACITRACIN/POLYMYXINB 3.5-400-5K
OINTMENT (GRAM) TOPICAL ONCE
OUTPATIENT
Start: 2024-12-11 | End: 2024-12-11

## 2024-12-11 RX ORDER — GENTAMICIN SULFATE 1 MG/G
OINTMENT TOPICAL ONCE
OUTPATIENT
Start: 2024-12-11 | End: 2024-12-11

## 2024-12-11 RX ORDER — LIDOCAINE 50 MG/G
OINTMENT TOPICAL ONCE
OUTPATIENT
Start: 2024-12-11 | End: 2024-12-11

## 2024-12-11 RX ORDER — LIDOCAINE HYDROCHLORIDE 20 MG/ML
JELLY TOPICAL ONCE
OUTPATIENT
Start: 2024-12-11 | End: 2024-12-11

## 2024-12-11 RX ORDER — BETAMETHASONE DIPROPIONATE 0.5 MG/G
CREAM TOPICAL ONCE
OUTPATIENT
Start: 2024-12-11 | End: 2024-12-11

## 2024-12-11 RX ORDER — LIDOCAINE HYDROCHLORIDE 40 MG/ML
SOLUTION TOPICAL ONCE
OUTPATIENT
Start: 2024-12-11 | End: 2024-12-11

## 2024-12-11 RX ORDER — BACITRACIN ZINC AND POLYMYXIN B SULFATE 500; 1000 [USP'U]/G; [USP'U]/G
OINTMENT TOPICAL ONCE
OUTPATIENT
Start: 2024-12-11 | End: 2024-12-11

## 2024-12-11 RX ORDER — SODIUM CHLOR/HYPOCHLOROUS ACID 0.033 %
SOLUTION, IRRIGATION IRRIGATION ONCE
OUTPATIENT
Start: 2024-12-11 | End: 2024-12-11

## 2024-12-11 RX ORDER — LIDOCAINE 40 MG/G
CREAM TOPICAL ONCE
OUTPATIENT
Start: 2024-12-11 | End: 2024-12-11

## 2024-12-11 NOTE — PATIENT INSTRUCTIONS
Visit Discharge/Physician Orders:  - Grafix authorization sent to insurance-denied per insurance   - Do not get your incision wet, wear a cap when you shower       Wound Location: scalp     Dressing orders:     1) Gather wound care supplies and arrange on clean table.     2) Wash your hands with soap and water or use alcohol based hand  for 20 seconds (sing \"Happy Birthday\" twice).    3) Cleanse wounds with normal saline or wound cleanser and gauze. Pat dry with clean gauze.    4) Scalp: Apply skin prep around the wound. Than apply collagen powder to the wound, moisten with saline,  Cover with ABD pad, secure lightly with tape. Change daily.  Keep all dressings clean & dry.    Follow up visit:   4 Weeks Wednesday January 8th at 10:15 am       We have sent your supply order to the following company:  [employer] Phone # [phone]   If you don't receive the items you were expecting or don't know what the items are that you received, call the company where the order was sent.   If you are unable to obtain wound supplies, continue to use the supplies you have available until you are able to reach us. It is most important to keep the wound covered at all times.  It is YOUR responsibility to make sure that supplies are re-ordered before you run out. Re-order telephone numbers are included in each package.     Keep next scheduled appointment. Please give 24 hour notice if unable to keep appointment. [phone]    If you experience any of the following, please call the Wound Care Service during business hours: Monday through Friday 8:00 am - 4:30 pm  ([phone]).   *Increase in pain   *Temperature over 101   *Increase in drainage from your wound or a foul odor   *Uncontrolled swelling   *Need for compression bandage changes due to slippage, breakthrough drainage    If you need medical attention outside of business hours, please contact your Primary Care Doctor or go to the nearest emergency room.

## 2024-12-11 NOTE — PROGRESS NOTES
Wound Care Supplies      Supply Company:     [employer] [address] [place] [address] f: [phone] f: [phone] p: [phone] [email or web address]      Ordering Center:   [facility]  [address] [address]  [phone]  WOUND CARE Dept: [phone]   FAX NUMBER [phone]    Patient Information:      Mendez Freeman  [address]  [place] [address]   [phone]   : 1945  AGE: 78 y.o.     GENDER: female   EPISODE DATE: 2024    Insurance:      PRIMARY INSURANCE:  Plan: HU[employer]NA GOLD PLUS HMO  Coverage: HUMANA MEDICARE  Effective Date: 2024  Group Number: [unfilled]  Subscriber Number: [id number] - (Medicare Managed)    Payer/Plan Subscr  Sex Relation Sub. Ins. ID Effective Group Num   1. [employer]NA MEDICA* MENDEZ FREEMAN* 1945 Female Self [id number]                                   [address] [address]       Patient Wound Information:      Problem List Items Addressed This Visit          Other    Surgical wound, non healing - Primary    Relevant Orders    Initiate Outpatient Wound Care Protocol       WOUNDS REQUIRING DRESSING SUPPLIES:     Wound 24 Head (Active)   Wound Image   24 1054   Wound Etiology Non-Healing Surgical 24 1054   Dressing Status Intact;Old drainage noted;New dressing applied 24 1054   Wound Cleansed Cleansed with saline 24 1054   Dressing/Treatment Collagen;ABD;Tape/Soft cloth adhesive tape 10/23/24 1056   Offloading for Diabetic Foot Ulcers Offloading not required 24 1054   Wound Length (cm) 3.5 cm 24 1054   Wound Width (cm) 3.1 cm 24 1054   Wound Depth (cm) 0.1 cm 24 1054   Wound Surface Area (cm^2) 10.85 cm^2 24 1054   Change in Wound Size % (l*w) -44.67 24 1054   Wound Volume (cm^3) 1.085 cm^3 24 1054   Wound Healing % 52 24 1054   Wound Assessment Epithelialization;Exposed structure bone;Pale granulation 
   Normal LV size and systolic function. EF 55-65%. Mild TR.      FAMILY HISTORY         Family History   Problem Relation Age of Onset    Heart Disease Mother     Diabetes Mother     Heart Disease Father        SOCIAL HISTORY       Social History     Tobacco Use    Smoking status: Former     Current packs/day: 0.00     Average packs/day: 0.2 packs/day for 24.0 years (4.8 ttl pk-yrs)     Types: Cigarettes     Start date: 1958     Quit date: 1982     Years since quittin.5     Passive exposure: Never    Smokeless tobacco: Never   Vaping Use    Vaping status: Never Used   Substance Use Topics    Alcohol use: No    Drug use: No       ALLERGIES       Allergies   Allergen Reactions    Aspirin     Carafate [Sucralfate]     Codeine     Darvocet [Propoxyphene N-Acetaminophen]     Darvon [Propoxyphene Hcl]     Humibid-Dm [Dextromethorphan-Guaifenesin]     Isosorbide     Macrobid [Nitrofurantoin Monohydrate Macrocrystals]     Other      CIPRO-HC OTIC    Pcn [Penicillins]     Phenobarbital     Protonix [Pantoprazole]     Statins     Sulfa Antibiotics     Tessalon [Benzonatate]     Zithromax [Azithromycin]     Bumex [Bumetanide] Rash         MEDICATIONS       Current Outpatient Medications on File Prior to Encounter   Medication Sig Dispense Refill    SODIUM CHLORIDE, EXTERNAL, (SALINE WOUND WASH) 0.9 % SOLN Apply 1 Bottle topically in the morning and 1 Bottle in the evening. 1 each 1    bumetanide (BUMEX) 1 MG tablet Take 1 tablet by mouth daily      SODIUM CHLORIDE, EXTERNAL, (SALINE WOUND WASH) 0.9 % SOLN Apply 1 Bottle topically daily 1 each 2    famotidine (PEPCID) 40 MG tablet Take 1 tablet by mouth daily      sucralfate (CARAFATE) 1 GM tablet Take 1 tablet by mouth 3 times daily (Patient not taking: Reported on 10/3/2024)      Saline 0.9 % SOLN Saline to cleanse wound and for dressing daily 500 mL 1    Zinc Sulfate (ZINC 15 PO) Take 50 mg by mouth daily      pantoprazole (PROTONIX) 20 MG tablet Take 2

## 2024-12-11 NOTE — PLAN OF CARE
Problem: Wound:  Goal: Will show signs of wound healing; wound closure and no evidence of infection  Description: Will show signs of wound healing; wound closure and no evidence of infection  Outcome: Progressing   Seen today for scalp wound. See AVS for discharge   Care plan reviewed with patient.  Patient verbalize understanding of the plan of care and contribute to goal setting.

## 2025-01-08 ENCOUNTER — HOSPITAL ENCOUNTER (OUTPATIENT)
Dept: WOUND CARE | Age: 80
Discharge: HOME | End: 2025-01-08
Attending: INTERNAL MEDICINE
Payer: MEDICARE

## 2025-01-08 VITALS
OXYGEN SATURATION: 95 % | DIASTOLIC BLOOD PRESSURE: 79 MMHG | TEMPERATURE: 98.7 F | RESPIRATION RATE: 18 BRPM | SYSTOLIC BLOOD PRESSURE: 169 MMHG | HEART RATE: 78 BPM

## 2025-01-08 DIAGNOSIS — T81.89XD NON-HEALING SURGICAL WOUND, SUBSEQUENT ENCOUNTER: Primary | ICD-10-CM

## 2025-01-08 PROCEDURE — 99212 OFFICE O/P EST SF 10 MIN: CPT

## 2025-01-08 RX ORDER — LIDOCAINE 50 MG/G
OINTMENT TOPICAL ONCE
OUTPATIENT
Start: 2025-01-08 | End: 2025-01-08

## 2025-01-08 RX ORDER — MUPIROCIN 20 MG/G
OINTMENT TOPICAL ONCE
OUTPATIENT
Start: 2025-01-08 | End: 2025-01-08

## 2025-01-08 RX ORDER — LIDOCAINE HYDROCHLORIDE 20 MG/ML
JELLY TOPICAL ONCE
OUTPATIENT
Start: 2025-01-08 | End: 2025-01-08

## 2025-01-08 RX ORDER — GENTAMICIN SULFATE 1 MG/G
OINTMENT TOPICAL ONCE
OUTPATIENT
Start: 2025-01-08 | End: 2025-01-08

## 2025-01-08 RX ORDER — BACITRACIN ZINC AND POLYMYXIN B SULFATE 500; 1000 [USP'U]/G; [USP'U]/G
OINTMENT TOPICAL ONCE
OUTPATIENT
Start: 2025-01-08 | End: 2025-01-08

## 2025-01-08 RX ORDER — TRIAMCINOLONE ACETONIDE 1 MG/G
OINTMENT TOPICAL ONCE
OUTPATIENT
Start: 2025-01-08 | End: 2025-01-08

## 2025-01-08 RX ORDER — CLOBETASOL PROPIONATE 0.5 MG/G
OINTMENT TOPICAL ONCE
OUTPATIENT
Start: 2025-01-08 | End: 2025-01-08

## 2025-01-08 RX ORDER — BETAMETHASONE DIPROPIONATE 0.5 MG/G
CREAM TOPICAL ONCE
OUTPATIENT
Start: 2025-01-08 | End: 2025-01-08

## 2025-01-08 RX ORDER — NEOMYCIN/BACITRACIN/POLYMYXINB 3.5-400-5K
OINTMENT (GRAM) TOPICAL ONCE
OUTPATIENT
Start: 2025-01-08 | End: 2025-01-08

## 2025-01-08 RX ORDER — SODIUM CHLOR/HYPOCHLOROUS ACID 0.033 %
SOLUTION, IRRIGATION IRRIGATION ONCE
OUTPATIENT
Start: 2025-01-08 | End: 2025-01-08

## 2025-01-08 RX ORDER — LIDOCAINE HYDROCHLORIDE 40 MG/ML
SOLUTION TOPICAL ONCE
OUTPATIENT
Start: 2025-01-08 | End: 2025-01-08

## 2025-01-08 RX ORDER — LIDOCAINE 40 MG/G
CREAM TOPICAL ONCE
OUTPATIENT
Start: 2025-01-08 | End: 2025-01-08

## 2025-01-08 RX ORDER — GINSENG 100 MG
CAPSULE ORAL ONCE
OUTPATIENT
Start: 2025-01-08 | End: 2025-01-08

## 2025-01-08 NOTE — PATIENT INSTRUCTIONS
Visit Discharge/Physician Orders:  - Grafix authorization sent to insurance-denied per insurance   - Do not get your incision wet, wear a cap when you shower   -will order supplies     Wound Location: scalp     Dressing orders:     1) Gather wound care supplies and arrange on clean table.     2) Wash your hands with soap and water or use alcohol based hand  for 20 seconds (sing \"Happy Birthday\" twice).    3) Cleanse wounds with normal saline or wound cleanser and gauze. Pat dry with clean gauze.    4) Scalp: Apply skin prep around the wound. Than apply a small saline moistened guaze.  Cover with ABD pad, secure lightly with tape. Change daily.  Keep all dressings clean & dry.    Follow up visit:   4 Weeks Wednesday February 5th at 11:00 am     We have sent your supply order to the following company:  [facility] Phone # [phone]   If you don't receive the items you were expecting or don't know what the items are that you received, call the company where the order was sent.   If you are unable to obtain wound supplies, continue to use the supplies you have available until you are able to reach us. It is most important to keep the wound covered at all times.  It is YOUR responsibility to make sure that supplies are re-ordered before you run out. Re-order telephone numbers are included in each package.     Keep next scheduled appointment. Please give 24 hour notice if unable to keep appointment. [phone]    If you experience any of the following, please call the Wound Care Service during business hours: Monday through Friday 8:00 am - 4:30 pm  ([phone]).   *Increase in pain   *Temperature over 101   *Increase in drainage from your wound or a foul odor   *Uncontrolled swelling   *Need for compression bandage changes due to slippage, breakthrough drainage    If you need medical attention outside of business hours, please contact your Primary Care Doctor or go to the nearest emergency room.

## 2025-01-08 NOTE — PROGRESS NOTES
[facility]     will CHAVEZ [place]  MEDICAL RECORD NUMBER:  [id number]  AGE: 79 y.o.   GENDER: female  : 1945  EPISODE DATE:  2025    Subjective:   Non healing scalp wound:     HISTORY OF PRESENT ILLNESS   She is a 79 year-old female patient who had excision of a skin cancer from the scalp over a year ago with wide surgical excision and has a nonhealing wound.  The wound failed to heal despite grafting and was referred to the wound clinic.we have been applying collagen and moist dressing.  She still has exposed skull and her insurance has declined to authorize Kerecis/grafix.    She has no new complaints. Her wound is smaller and she is having  PAST MEDICAL HISTORY                 Diagnosis Date    Arthritis     Asthma     CAD (coronary artery disease)     Deep vein thrombosis (DVT) ([facility])     Escherichia coli infection     H pylori ulcer     H/O coagulation disorder 2014    HLD (hyperlipidemia)     HTN (hypertension)     Hx of blood clots     Joint pain     Morbid obesity     NSTEMI (non-ST elevated myocardial infarction) ([facility]) 2014    Postprocedural non-healing wound     S/P cardiac catheterization: 2014: 99% distal RCA, 90% mid-LAD, 85% ostial diagonal, 60% LCx. 2014: 99% distal RCA, 90% mid-LAD, 85% ostial diagonal, 60% LCx. Dr. Wilson    Stroke ([facility])     Surgical wound, non healing 2024    Non healing scalp wound following wide excision of skin cancer. Has exposed skull    Swelling     Wheezing      PAST SURGICAL HISTORY     PAST SURGICAL HISTORY    Past Surgical History:   Procedure Laterality Date    ANKLE SURGERY  1990    CARDIOVASCULAR STRESS TEST  2010    No obvious stress induced ischemia. EF 71%.     CORONARY ARTERY BYPASS GRAFT  06/03/2014    x4    DIAGNOSTIC CARDIAC CATH LAB PROCEDURE      EYE SURGERY      HYSTERECTOMY (CERVIX STATUS UNKNOWN)  1972    TOENAIL EXCISION      ingrown toenail removed

## 2025-01-08 NOTE — PLAN OF CARE
Problem: Wound:  Goal: Will show signs of wound healing; wound closure and no evidence of infection  Description: Will show signs of wound healing; wound closure and no evidence of infection  Outcome: Progressing   Scalp wound continues. See AVS for discharge   Care plan reviewed with patient.  Patient verbalize understanding of the plan of care and contribute to goal setting.

## 2025-02-05 ENCOUNTER — HOSPITAL ENCOUNTER (OUTPATIENT)
Dept: WOUND CARE | Age: 80
Discharge: HOME | End: 2025-02-05
Attending: INTERNAL MEDICINE
Payer: MEDICARE

## 2025-02-05 VITALS
HEART RATE: 73 BPM | OXYGEN SATURATION: 93 % | SYSTOLIC BLOOD PRESSURE: 172 MMHG | TEMPERATURE: 96.9 F | DIASTOLIC BLOOD PRESSURE: 72 MMHG | RESPIRATION RATE: 18 BRPM

## 2025-02-05 DIAGNOSIS — T81.89XD NON-HEALING SURGICAL WOUND, SUBSEQUENT ENCOUNTER: Primary | ICD-10-CM

## 2025-02-05 PROCEDURE — 99213 OFFICE O/P EST LOW 20 MIN: CPT

## 2025-02-05 RX ORDER — LIDOCAINE HYDROCHLORIDE 20 MG/ML
JELLY TOPICAL ONCE
OUTPATIENT
Start: 2025-02-05 | End: 2025-02-05

## 2025-02-05 RX ORDER — SODIUM CHLOR/HYPOCHLOROUS ACID 0.033 %
SOLUTION, IRRIGATION IRRIGATION ONCE
OUTPATIENT
Start: 2025-02-05 | End: 2025-02-05

## 2025-02-05 RX ORDER — LIDOCAINE HYDROCHLORIDE 40 MG/ML
SOLUTION TOPICAL ONCE
OUTPATIENT
Start: 2025-02-05 | End: 2025-02-05

## 2025-02-05 RX ORDER — BACITRACIN ZINC AND POLYMYXIN B SULFATE 500; 1000 [USP'U]/G; [USP'U]/G
OINTMENT TOPICAL ONCE
OUTPATIENT
Start: 2025-02-05 | End: 2025-02-05

## 2025-02-05 RX ORDER — BETAMETHASONE DIPROPIONATE 0.5 MG/G
CREAM TOPICAL ONCE
OUTPATIENT
Start: 2025-02-05 | End: 2025-02-05

## 2025-02-05 RX ORDER — LIDOCAINE 40 MG/G
CREAM TOPICAL ONCE
OUTPATIENT
Start: 2025-02-05 | End: 2025-02-05

## 2025-02-05 RX ORDER — NEOMYCIN/BACITRACIN/POLYMYXINB 3.5-400-5K
OINTMENT (GRAM) TOPICAL ONCE
OUTPATIENT
Start: 2025-02-05 | End: 2025-02-05

## 2025-02-05 RX ORDER — GINSENG 100 MG
CAPSULE ORAL ONCE
OUTPATIENT
Start: 2025-02-05 | End: 2025-02-05

## 2025-02-05 RX ORDER — GENTAMICIN SULFATE 1 MG/G
OINTMENT TOPICAL ONCE
OUTPATIENT
Start: 2025-02-05 | End: 2025-02-05

## 2025-02-05 RX ORDER — MUPIROCIN 20 MG/G
OINTMENT TOPICAL ONCE
OUTPATIENT
Start: 2025-02-05 | End: 2025-02-05

## 2025-02-05 RX ORDER — CLOBETASOL PROPIONATE 0.5 MG/G
OINTMENT TOPICAL ONCE
OUTPATIENT
Start: 2025-02-05 | End: 2025-02-05

## 2025-02-05 RX ORDER — LIDOCAINE 50 MG/G
OINTMENT TOPICAL ONCE
OUTPATIENT
Start: 2025-02-05 | End: 2025-02-05

## 2025-02-05 RX ORDER — TRIAMCINOLONE ACETONIDE 1 MG/G
OINTMENT TOPICAL ONCE
OUTPATIENT
Start: 2025-02-05 | End: 2025-02-05

## 2025-02-05 NOTE — PATIENT INSTRUCTIONS
Visit Discharge/Physician Orders:  - Grafix authorization sent to insurance-denied per insurance   - Do not get your incision wet, wear a cap when you shower   -Dr Garcia thinks surgery will be your best option to finish closing the wound  -keep your appointment with Dr Redman, have him call Dr Garcia after your appointment to come up with a plan      Wound Location: scalp     Dressing orders:     1) Gather wound care supplies and arrange on clean table.     2) Wash your hands with soap and water or use alcohol based hand  for 20 seconds (sing \"Happy Birthday\" twice).    3) Cleanse wounds with normal saline or wound cleanser and gauze. Pat dry with clean gauze.    4) Scalp: Apply skin prep around the wound. Than apply a small saline moistened guaze.  Cover with ABD pad, secure lightly with tape. Change daily.  Keep all dressings clean & dry.    Follow up visit:   2 months Wednesday April 2nd at 11:00 am     We have sent your supply order to the following company:  [facility] Phone # [phone]   If you don't receive the items you were expecting or don't know what the items are that you received, call the company where the order was sent.   If you are unable to obtain wound supplies, continue to use the supplies you have available until you are able to reach us. It is most important to keep the wound covered at all times.  It is YOUR responsibility to make sure that supplies are re-ordered before you run out. Re-order telephone numbers are included in each package.     Keep next scheduled appointment. Please give 24 hour notice if unable to keep appointment. [phone]    If you experience any of the following, please call the Wound Care Service during business hours: Monday through Friday 8:00 am - 4:30 pm  ([phone]).   *Increase in pain   *Temperature over 101   *Increase in drainage from your wound or a foul odor   *Uncontrolled swelling   *Need for compression bandage changes due to slippage,

## 2025-02-05 NOTE — PROGRESS NOTES
ANILA SACHI  : 1953  10/05/17 15:32:26  ACCOUNT:  [id number]  HOME PHONE:  [phone]  WORK PHONE:           Patient is scheduled for LHC w/Dr. Cisneros on 10.09.17 at [facility].  Patient confirmed procedure date and is agreeable to complete PAT.  Orders given to RN to review/sign and will be faxed to cath lab.       pink conjunctiva, unicteric sclera, moist oral mucosa.  Skin:  Warm and dry.  CNS: Oriented  She has an open wound on the scalp has dry collagen on the wound. No redness or drainage  Wound 05/01/24 Head (Active)   Wound Image   12/11/24 1054   Wound Etiology Non-Healing Surgical 12/11/24 1054   Dressing Status Intact;Old drainage noted;New dressing applied 12/11/24 1054   Wound Cleansed Cleansed with saline 12/11/24 1054   Dressing/Treatment Collagen;ABD;Tape/Soft cloth adhesive tape 10/23/24 1056   Offloading for Diabetic Foot Ulcers Offloading not required 12/11/24 1054   Wound Length (cm) 3.5 cm 12/11/24 1054   Wound Width (cm) 3.1 cm 12/11/24 1054   Wound Depth (cm) 0.1 cm 12/11/24 1054   Wound Surface Area (cm^2) 10.85 cm^2 12/11/24 1054   Change in Wound Size % (l*w) -44.67 12/11/24 1054   Wound Volume (cm^3) 1.085 cm^3 12/11/24 1054   Wound Healing % 52 12/11/24 1054   Wound Assessment Epithelialization;Exposed structure bone;Pale granulation tissue;Slough 12/11/24 1054   Drainage Amount Scant (moist but unmeasurable) 12/11/24 1054   Drainage Description Serosanguinous 12/11/24 1054   Odor None 12/11/24 1054   Carmen-wound Assessment Dry/flaky;Blanchable erythema 12/11/24 1054   Margins Attached edges 12/11/24 1054   Wound Thickness Description not for Pressure Injury Full thickness 12/11/24 1054   Number of days: 224         LABS    LABS     Lab Results   Component Value Date/Time    BC No growth-preliminary  No growth   11/13/2015 05:15 AM       Assessment:   Nonhealing postsurgical wound.  She still has exposed bone.  Continue moist dressing  She will need surgical closure. Will discuss with her dermatologist about referral to a plastic surgeon?  Patient Active Problem List   Diagnosis Code    CAD (coronary artery disease) I25.10    HTN (hypertension) I10    HLD (hyperlipidemia) E78.5    Deep vein thrombosis (DVT) ([facility]) I82.409    Asthma J45.909    Wheezing R06.2    Stroke ([facility]) I63.9    Swelling R60.9

## 2025-04-02 ENCOUNTER — TELEPHONE (OUTPATIENT)
Dept: WOUND CARE | Age: 80
End: 2025-04-02

## 2025-04-02 ENCOUNTER — HOSPITAL ENCOUNTER (OUTPATIENT)
Dept: WOUND CARE | Age: 80
Discharge: HOME | End: 2025-04-02
Attending: INTERNAL MEDICINE
Payer: MEDICARE

## 2025-04-02 VITALS
OXYGEN SATURATION: 92 % | SYSTOLIC BLOOD PRESSURE: 144 MMHG | TEMPERATURE: 97.5 F | DIASTOLIC BLOOD PRESSURE: 65 MMHG | RESPIRATION RATE: 18 BRPM | HEART RATE: 86 BPM

## 2025-04-02 DIAGNOSIS — T81.89XD NON-HEALING SURGICAL WOUND, SUBSEQUENT ENCOUNTER: Primary | ICD-10-CM

## 2025-04-02 DIAGNOSIS — T81.89XA NON-HEALING SURGICAL WOUND, INITIAL ENCOUNTER: ICD-10-CM

## 2025-04-02 PROCEDURE — 99213 OFFICE O/P EST LOW 20 MIN: CPT

## 2025-04-02 RX ORDER — TRIAMCINOLONE ACETONIDE 1 MG/G
OINTMENT TOPICAL ONCE
OUTPATIENT
Start: 2025-04-02 | End: 2025-04-02

## 2025-04-02 RX ORDER — LIDOCAINE 50 MG/G
OINTMENT TOPICAL ONCE
OUTPATIENT
Start: 2025-04-02 | End: 2025-04-02

## 2025-04-02 RX ORDER — LIDOCAINE 40 MG/G
CREAM TOPICAL ONCE
OUTPATIENT
Start: 2025-04-02 | End: 2025-04-02

## 2025-04-02 RX ORDER — MUPIROCIN 20 MG/G
OINTMENT TOPICAL ONCE
OUTPATIENT
Start: 2025-04-02 | End: 2025-04-02

## 2025-04-02 RX ORDER — CLOBETASOL PROPIONATE 0.5 MG/G
OINTMENT TOPICAL ONCE
OUTPATIENT
Start: 2025-04-02 | End: 2025-04-02

## 2025-04-02 RX ORDER — LIDOCAINE HYDROCHLORIDE 20 MG/ML
JELLY TOPICAL ONCE
OUTPATIENT
Start: 2025-04-02 | End: 2025-04-02

## 2025-04-02 RX ORDER — BETAMETHASONE DIPROPIONATE 0.5 MG/G
CREAM TOPICAL ONCE
OUTPATIENT
Start: 2025-04-02 | End: 2025-04-02

## 2025-04-02 RX ORDER — GINSENG 100 MG
CAPSULE ORAL ONCE
OUTPATIENT
Start: 2025-04-02 | End: 2025-04-02

## 2025-04-02 RX ORDER — LIDOCAINE HYDROCHLORIDE 40 MG/ML
SOLUTION TOPICAL ONCE
OUTPATIENT
Start: 2025-04-02 | End: 2025-04-02

## 2025-04-02 RX ORDER — BACITRACIN ZINC AND POLYMYXIN B SULFATE 500; 1000 [USP'U]/G; [USP'U]/G
OINTMENT TOPICAL ONCE
OUTPATIENT
Start: 2025-04-02 | End: 2025-04-02

## 2025-04-02 RX ORDER — GENTAMICIN SULFATE 1 MG/G
OINTMENT TOPICAL ONCE
OUTPATIENT
Start: 2025-04-02 | End: 2025-04-02

## 2025-04-02 RX ORDER — SODIUM CHLOR/HYPOCHLOROUS ACID 0.033 %
SOLUTION, IRRIGATION IRRIGATION ONCE
OUTPATIENT
Start: 2025-04-02 | End: 2025-04-02

## 2025-04-02 RX ORDER — NEOMYCIN/BACITRACIN/POLYMYXINB 3.5-400-5K
OINTMENT (GRAM) TOPICAL ONCE
OUTPATIENT
Start: 2025-04-02 | End: 2025-04-02

## 2025-04-02 NOTE — PATIENT INSTRUCTIONS
Visit Discharge/Physician Orders:  - Referral to Dr. Zurita/plastic surgeon today.   - Do not get your incision wet, wear a cap when you shower   - Dr Garcia thinks surgery will be your best option to finish closing the wound  - Supplies ordered today.    Wound Location: scalp     Dressing orders:     1) Gather wound care supplies and arrange on clean table.     2) Wash your hands with soap and water or use alcohol based hand  for 20 seconds (sing \"Happy Birthday\" twice).    3) Cleanse wounds with normal saline or wound cleanser and gauze. Pat dry with clean gauze.    4) Scalp: Apply skin prep around the wound. Cover with silicone bordered foam. Change 3 times a week.      Keep all dressings clean & dry.    Follow up visit:   4 weeks Wednesday April 30th at 11:00 am     We have sent your supply order to the following company:  [employer] Phone # [phone]   If you don't receive the items you were expecting or don't know what the items are that you received, call the company where the order was sent.   If you are unable to obtain wound supplies, continue to use the supplies you have available until you are able to reach us. It is most important to keep the wound covered at all times.  It is YOUR responsibility to make sure that supplies are re-ordered before you run out. Re-order telephone numbers are included in each package.     Keep next scheduled appointment. Please give 24 hour notice if unable to keep appointment. [phone]    If you experience any of the following, please call the Wound Care Service during business hours: Monday through Friday 8:00 am - 4:30 pm  ([phone]).   *Increase in pain   *Temperature over 101   *Increase in drainage from your wound or a foul odor   *Uncontrolled swelling   *Need for compression bandage changes due to slippage, breakthrough drainage    If you need medical attention outside of business hours, please contact your Primary Care Doctor or go to the nearest

## 2025-04-02 NOTE — PROGRESS NOTES
[facility]     will [place]  MEDICAL RECORD NUMBER:  [id number]  AGE: 79 y.o.   GENDER: female  : 1945  EPISODE DATE:  2025    Subjective:   Non healing scalp wound:     HISTORY OF PRESENT ILLNESS   She is a 79 year-old female patient who had excision of a skin cancer from the scalp over a year ago with wide surgical excision and has a nonhealing wound.  The wound failed to heal despite grafting and was referred to the wound clinic. She was advised to have closure of the wound by her dermatologist but her daughter wants it done in hospital . She wants referral to plastic surgeon    PAST MEDICAL HISTORY                 Diagnosis Date    Arthritis     Asthma     CAD (coronary artery disease)     Deep vein thrombosis (DVT) ([facility])     Escherichia coli infection     H pylori ulcer     H/O coagulation disorder 2014    HLD (hyperlipidemia)     HTN (hypertension)     Hx of blood clots     Joint pain     Morbid obesity     NSTEMI (non-ST elevated myocardial infarction) ([facility]) 2014    Postprocedural non-healing wound     S/P cardiac catheterization: 2014: 99% distal RCA, 90% mid-LAD, 85% ostial diagonal, 60% LCx. 2014: 99% distal RCA, 90% mid-LAD, 85% ostial diagonal, 60% LCx. Dr. Wilson    Stroke ([facility])     Surgical wound, non healing 2024    Non healing scalp wound following wide excision of skin cancer. Has exposed skull    Swelling     Wheezing      PAST SURGICAL HISTORY     PAST SURGICAL HISTORY    Past Surgical History:   Procedure Laterality Date    ANKLE SURGERY  1990    CARDIOVASCULAR STRESS TEST  2010    No obvious stress induced ischemia. EF 71%.     CORONARY ARTERY BYPASS GRAFT  06/03/2014    x4    DIAGNOSTIC CARDIAC CATH LAB PROCEDURE      EYE SURGERY      HYSTERECTOMY (CERVIX STATUS UNKNOWN)  1972    TOENAIL EXCISION      ingrown toenail removed    TRANSTHORACIC ECHOCARDIOGRAM  2010    Normal LV size

## 2025-04-02 NOTE — PLAN OF CARE
Problem: Wound:  Goal: Will show signs of wound healing; wound closure and no evidence of infection  Description: Will show signs of wound healing; wound closure and no evidence of infection  Outcome: Progressing   Patient presented to wound clinic for follow up of scalp wound. Discharge instructions per AVS. Follow up scheduled. Referral to Dr. Zurita/plastic surgeon.    Care plan reviewed with patient and daughter.  Patient and daughter verbalize understanding of the plan of care and contribute to goal setting.

## 2025-04-02 NOTE — PROGRESS NOTES
This RN has reviewed and agrees with KOTA Lira's data collection and has collaborated with this LPN regarding the patient's care plan.

## 2025-04-02 NOTE — PROGRESS NOTES
Wound Care Supplies      Supply Company:     [employer] [address] [place] [address] f: [phone] f: [phone] p: [phone] [email or web address]      Ordering Center:   [facility]  [address] [address]  [phone]  WOUND CARE Dept: [phone]   FAX NUMBER [phone]    Patient Information:      Mendez Freeman  [address] [place] [address]   [phone]   : 1945  AGE: 79 y.o.     GENDER: female   EPISODE DATE: 2025    Insurance:      PRIMARY INSURANCE:  Plan: [facility] MEDICARE COMPLETE  Coverage: [facility] MEDICARE  Effective Date: 2025  Group Number: [unfilled]  Subscriber Number: [id number] - (Medicare Managed)    Payer/Plan Subscr  Sex Relation Sub. Ins. ID Effective Group Num   1. [employer] MEDICAR* MENDEZ FREEMAN* 1945 Female Self [id number]                                   [address]   2. [place] - [place]* MENDEZ FREEMAN* 1945 Female Self [id number]                                     [address] [address]       Patient Wound Information:      Problem List Items Addressed This Visit          Other    Surgical wound, non healing - Primary    Relevant Orders    Initiate Outpatient Wound Care Protocol    Julian Hernández MD, Plastic Surgery, [place]       WOUNDS REQUIRING DRESSING SUPPLIES:     Wound 24 Head (Active)   Wound Image   25 1103   Wound Etiology Non-Healing Surgical 25 1103   Dressing Status New drainage noted;New dressing applied 25 1103   Wound Cleansed Cleansed with saline 25 1103   Dressing/Treatment Silicone border;Foam 25 1103   Offloading for Diabetic Foot Ulcers Offloading not required 25 1103   Wound Length (cm) 2.3 cm 25 1103   Wound Width (cm) 3 cm 25 1103   Wound Depth (cm) 0.1 cm 25 1103   Wound Surface Area (cm^2) 6.9 cm^2 25 1103   Change in Wound Size % (l*w) 8 25 1103   Wound Volume (cm^3) 0.69 cm^3

## 2025-04-02 NOTE — TELEPHONE ENCOUNTER
----- Message from Zoey MADSEN sent at 4/2/2025  1:31 PM EDT -----   [phone] TANA AT DR ORTIZ OFFICE HAS QUESTIONS ABOUT THE REFERRAL THAT WAS SENT TO THEM TODAY. ASKS WHO DID THE SURGERY LAST YEAR, BECAUSE THEY HAVE NOT SEEN HER SINCE 2017?

## 2025-04-02 NOTE — TELEPHONE ENCOUNTER
Returned call to Toshia at Dr. Zurita office, no answer, left VM. Dr. Garcia's office note was faxed to Dr. Zurita office in addition to the referral to hopefully answer some of these questions.

## 2025-04-08 ENCOUNTER — TELEPHONE (OUTPATIENT)
Dept: WOUND CARE | Age: 80
End: 2025-04-08

## 2025-04-08 NOTE — TELEPHONE ENCOUNTER
Patient called to update that Dr. Zurita was unable to see her because he doesn't do that kind of surgery anymore. He told her she would need referral to tertiary care. Patient would like to talk to her daughter about that and then at next appt do the referral to tertiary care - sent perfect serve message to Dr. Garcia to update.

## 2025-04-09 ENCOUNTER — TELEPHONE (OUTPATIENT)
Dept: WOUND CARE | Age: 80
End: 2025-04-09

## 2025-04-09 NOTE — TELEPHONE ENCOUNTER
Patient daughter asking about referral process and would like patient to be referred to [facility] sooner than later as they have a lot of summer events to schedule around.   Spoke with physician, referral sent to [facility]

## 2025-04-09 NOTE — TELEPHONE ENCOUNTER
----- Message from Zoey MADSEN sent at 4/9/2025 10:08 AM EDT -----   [phone] PATRICIA(DAUGHTER) ASKED IF SOMEONE CAN EXPLAIN THE REFERRAL PROCESS FOR MENDEZ TO BE ABLE TO GO TO [facility]?

## 2025-04-30 ENCOUNTER — HOSPITAL ENCOUNTER (OUTPATIENT)
Dept: WOUND CARE | Age: 80
Discharge: HOME | End: 2025-04-30
Attending: INTERNAL MEDICINE
Payer: MEDICARE

## 2025-04-30 VITALS
HEART RATE: 69 BPM | TEMPERATURE: 97.9 F | RESPIRATION RATE: 18 BRPM | OXYGEN SATURATION: 95 % | DIASTOLIC BLOOD PRESSURE: 75 MMHG | SYSTOLIC BLOOD PRESSURE: 174 MMHG

## 2025-04-30 DIAGNOSIS — T81.89XD NON-HEALING SURGICAL WOUND, SUBSEQUENT ENCOUNTER: Primary | ICD-10-CM

## 2025-04-30 PROCEDURE — 99213 OFFICE O/P EST LOW 20 MIN: CPT

## 2025-04-30 RX ORDER — LIDOCAINE 40 MG/G
CREAM TOPICAL ONCE
OUTPATIENT
Start: 2025-04-30 | End: 2025-04-30

## 2025-04-30 RX ORDER — NEOMYCIN/BACITRACIN/POLYMYXINB 3.5-400-5K
OINTMENT (GRAM) TOPICAL ONCE
OUTPATIENT
Start: 2025-04-30 | End: 2025-04-30

## 2025-04-30 RX ORDER — MUPIROCIN 20 MG/G
OINTMENT TOPICAL ONCE
OUTPATIENT
Start: 2025-04-30 | End: 2025-04-30

## 2025-04-30 RX ORDER — LIDOCAINE HYDROCHLORIDE 20 MG/ML
JELLY TOPICAL ONCE
OUTPATIENT
Start: 2025-04-30 | End: 2025-04-30

## 2025-04-30 RX ORDER — BETAMETHASONE DIPROPIONATE 0.5 MG/G
CREAM TOPICAL ONCE
OUTPATIENT
Start: 2025-04-30 | End: 2025-04-30

## 2025-04-30 RX ORDER — LIDOCAINE 50 MG/G
OINTMENT TOPICAL ONCE
OUTPATIENT
Start: 2025-04-30 | End: 2025-04-30

## 2025-04-30 RX ORDER — BACITRACIN ZINC AND POLYMYXIN B SULFATE 500; 1000 [USP'U]/G; [USP'U]/G
OINTMENT TOPICAL ONCE
OUTPATIENT
Start: 2025-04-30 | End: 2025-04-30

## 2025-04-30 RX ORDER — CLOBETASOL PROPIONATE 0.5 MG/G
OINTMENT TOPICAL ONCE
OUTPATIENT
Start: 2025-04-30 | End: 2025-04-30

## 2025-04-30 RX ORDER — TRIAMCINOLONE ACETONIDE 1 MG/G
OINTMENT TOPICAL ONCE
OUTPATIENT
Start: 2025-04-30 | End: 2025-04-30

## 2025-04-30 RX ORDER — GENTAMICIN SULFATE 1 MG/G
OINTMENT TOPICAL ONCE
OUTPATIENT
Start: 2025-04-30 | End: 2025-04-30

## 2025-04-30 RX ORDER — LIDOCAINE HYDROCHLORIDE 40 MG/ML
SOLUTION TOPICAL ONCE
OUTPATIENT
Start: 2025-04-30 | End: 2025-04-30

## 2025-04-30 RX ORDER — SODIUM CHLOR/HYPOCHLOROUS ACID 0.033 %
SOLUTION, IRRIGATION IRRIGATION ONCE
OUTPATIENT
Start: 2025-04-30 | End: 2025-04-30

## 2025-04-30 RX ORDER — GINSENG 100 MG
CAPSULE ORAL ONCE
OUTPATIENT
Start: 2025-04-30 | End: 2025-04-30

## 2025-04-30 NOTE — PLAN OF CARE
Problem: Wound:  Goal: Will show signs of wound healing; wound closure and no evidence of infection  Description: Will show signs of wound healing; wound closure and no evidence of infection  Outcome: Progressing   Patient seen in clinic today for head wound. No s/s of infection. See AVS. Follow up in 6 week/s. Care plan reviewed with patient. Patient verbalizes understanding of the plan of care and contributes to goal setting.

## 2025-04-30 NOTE — PROGRESS NOTES
FAMILY HISTORY         Family History   Problem Relation Age of Onset    Heart Disease Mother     Diabetes Mother     Heart Disease Father        SOCIAL HISTORY       Social History     Tobacco Use    Smoking status: Former     Current packs/day: 0.00     Average packs/day: 0.2 packs/day for 24.0 years (4.8 ttl pk-yrs)     Types: Cigarettes     Start date: 1958     Quit date: 1982     Years since quittin.8     Passive exposure: Never    Smokeless tobacco: Never   Vaping Use    Vaping status: Never Used   Substance Use Topics    Alcohol use: No    Drug use: No       ALLERGIES       Allergies   Allergen Reactions    Aspirin     Carafate [Sucralfate]     Codeine     Darvocet [Propoxyphene N-Acetaminophen]     Darvon [Propoxyphene Hcl]     Humibid-Dm [Dextromethorphan-Guaifenesin]     Isosorbide     Macrobid [Nitrofurantoin Monohydrate Macrocrystals]     Other      CIPRO-HC OTIC    Pcn [Penicillins]     Phenobarbital     Protonix [Pantoprazole]     Statins     Sulfa Antibiotics     Tessalon [Benzonatate]     Zithromax [Azithromycin]     Bumex [Bumetanide] Rash         MEDICATIONS       Current Outpatient Medications on File Prior to Encounter   Medication Sig Dispense Refill    bumetanide (BUMEX) 1 MG tablet Take 1 tablet by mouth daily      SODIUM CHLORIDE, EXTERNAL, (SALINE WOUND WASH) 0.9 % SOLN Apply 1 Bottle topically daily 1 each 2    famotidine (PEPCID) 40 MG tablet Take 1 tablet by mouth daily      sucralfate (CARAFATE) 1 GM tablet Take 1 tablet by mouth 3 times daily (Patient not taking: Reported on 10/3/2024)      Saline 0.9 % SOLN Saline to cleanse wound and for dressing daily 500 mL 1    Zinc Sulfate (ZINC 15 PO) Take 50 mg by mouth daily      pantoprazole (PROTONIX) 20 MG tablet Take 2 tablets by mouth daily 30 tablet 0    levothyroxine (SYNTHROID) 50 MCG tablet Take 88 mcg by mouth Daily      metoprolol (LOPRESSOR) 25 MG tablet TAKE ONE-HALF TABLET BY MOUTH TWICE DAILY 90 tablet 1

## 2025-04-30 NOTE — PATIENT INSTRUCTIONS
Visit Discharge/Physician Orders:  - Supplies ordered today  - Referral to Dr. Zurita/plastic surgeon   - Do not get your incision wet, wear a cap when you shower   - Dr Garcia thinks surgery will be your best option to finish closing the wound      Wound Location: scalp     Dressing orders:     1) Gather wound care supplies and arrange on clean table.     2) Wash your hands with soap and water or use alcohol based hand  for 20 seconds (sing \"Happy Birthday\" twice).    3) Cleanse wounds with normal saline or wound cleanser and gauze. Pat dry with clean gauze.    4) Scalp: Apply skin prep around the wound. Cover with silicone bordered foam. Change daily     Keep all dressings clean & dry.    Follow up visit:   6 weeks Wednesday 6/11/25 at 11:00 am     We have sent your supply order to the following company:  [employer] Phone # [phone]   If you don't receive the items you were expecting or don't know what the items are that you received, call the company where the order was sent.   If you are unable to obtain wound supplies, continue to use the supplies you have available until you are able to reach us. It is most important to keep the wound covered at all times.  It is YOUR responsibility to make sure that supplies are re-ordered before you run out. Re-order telephone numbers are included in each package.     Keep next scheduled appointment. Please give 24 hour notice if unable to keep appointment. [phone]    If you experience any of the following, please call the Wound Care Service during business hours: Monday through Friday 8:00 am - 4:30 pm  ([phone]).   *Increase in pain   *Temperature over 101   *Increase in drainage from your wound or a foul odor   *Uncontrolled swelling   *Need for compression bandage changes due to slippage, breakthrough drainage    If you need medical attention outside of business hours, please contact your Primary Care Doctor or go to the nearest emergency room.

## 2025-05-22 ENCOUNTER — TELEPHONE (OUTPATIENT)
Dept: CARDIOLOGY CLINIC | Age: 80
End: 2025-05-22

## 2025-05-22 DIAGNOSIS — R94.31 ABNORMAL EKG: ICD-10-CM

## 2025-05-22 DIAGNOSIS — R06.02 SHORTNESS OF BREATH: Primary | ICD-10-CM

## 2025-05-22 NOTE — TELEPHONE ENCOUNTER
Pre op Risk Assessment  Pt is calling to see if she can be cleared for sx   Send to Dr. Navarro in his return     Procedure head sx   Physician Dr Carlton Viera   Date of surgery/procedure 6-    Last OV 10/3/24  Last Stress 1/31/2020  Last Echo 1/31/2020  Last Cath   Last Stent   Is patient on blood thinners coumadin   Hold Meds/how many days

## 2025-06-09 NOTE — TELEPHONE ENCOUNTER
Call to pt, stress test and echo scheduled 06-30-25  Pt informed surgery will need to be postponed until testing done  Date, time and instructions reviewed with pt and mailed

## 2025-06-11 ENCOUNTER — HOSPITAL ENCOUNTER (OUTPATIENT)
Dept: WOUND CARE | Age: 80
Discharge: HOME | End: 2025-06-11
Attending: INTERNAL MEDICINE

## 2025-06-11 DIAGNOSIS — T81.89XD NON-HEALING SURGICAL WOUND, SUBSEQUENT ENCOUNTER: Primary | ICD-10-CM

## 2025-06-11 RX ORDER — BACITRACIN ZINC AND POLYMYXIN B SULFATE 500; 1000 [USP'U]/G; [USP'U]/G
OINTMENT TOPICAL ONCE
OUTPATIENT
Start: 2025-06-11 | End: 2025-06-11

## 2025-06-11 RX ORDER — LIDOCAINE 50 MG/G
OINTMENT TOPICAL ONCE
OUTPATIENT
Start: 2025-06-11 | End: 2025-06-11

## 2025-06-11 RX ORDER — GENTAMICIN SULFATE 1 MG/G
OINTMENT TOPICAL ONCE
OUTPATIENT
Start: 2025-06-11 | End: 2025-06-11

## 2025-06-11 RX ORDER — MUPIROCIN 2 %
OINTMENT (GRAM) TOPICAL ONCE
OUTPATIENT
Start: 2025-06-11 | End: 2025-06-11

## 2025-06-11 RX ORDER — SODIUM CHLOR/HYPOCHLOROUS ACID 0.033 %
SOLUTION, IRRIGATION IRRIGATION ONCE
OUTPATIENT
Start: 2025-06-11 | End: 2025-06-11

## 2025-06-11 RX ORDER — GINSENG 100 MG
CAPSULE ORAL ONCE
OUTPATIENT
Start: 2025-06-11 | End: 2025-06-11

## 2025-06-11 RX ORDER — LIDOCAINE HYDROCHLORIDE 20 MG/ML
JELLY TOPICAL ONCE
OUTPATIENT
Start: 2025-06-11 | End: 2025-06-11

## 2025-06-11 RX ORDER — LIDOCAINE 40 MG/G
CREAM TOPICAL ONCE
OUTPATIENT
Start: 2025-06-11 | End: 2025-06-11

## 2025-06-11 RX ORDER — LIDOCAINE HYDROCHLORIDE 40 MG/ML
SOLUTION TOPICAL ONCE
OUTPATIENT
Start: 2025-06-11 | End: 2025-06-11

## 2025-06-11 RX ORDER — TRIAMCINOLONE ACETONIDE 1 MG/G
OINTMENT TOPICAL ONCE
OUTPATIENT
Start: 2025-06-11 | End: 2025-06-11

## 2025-06-11 RX ORDER — BETAMETHASONE DIPROPIONATE 0.5 MG/G
CREAM TOPICAL ONCE
OUTPATIENT
Start: 2025-06-11 | End: 2025-06-11

## 2025-06-11 RX ORDER — NEOMYCIN/BACITRACIN/POLYMYXINB 3.5-400-5K
OINTMENT (GRAM) TOPICAL ONCE
OUTPATIENT
Start: 2025-06-11 | End: 2025-06-11

## 2025-06-11 RX ORDER — CLOBETASOL PROPIONATE 0.5 MG/G
OINTMENT TOPICAL ONCE
OUTPATIENT
Start: 2025-06-11 | End: 2025-06-11

## 2025-06-11 NOTE — PROGRESS NOTES
[facility]          Progress Note and Procedure Note      Radha Freeman  MEDICAL RECORD NUMBER:  [id number]  AGE: 79 y.o.   GENDER: female  : 1945  EPISODE DATE:  2025    Subjective:     SUBJECTIVE     Chief Complaint   Patient presents with    Wound Check     Scalp            HISTORY OF PRESENT ILLNESS      Radha Freeman is a 79 y.o. female who presents today for wound/ulcer evaluation.  She has a nonhealing postsurgical scalp wound after she had resection of a skin cancer.  She was referred to [facility] for plastic surgeon however her insurance was not in their network and was declined at surgery.  She  was referred to a surgeon at [facility] who is planning to do surgery.  She has been cleared for surgery.  Her open wound remained stable she has still exposed bone.  No drainage and no other issues.     PAST MEDICAL HISTORY         Diagnosis Date    Arthritis     Asthma     CAD (coronary artery disease)     Deep vein thrombosis (DVT) ([facility])     Escherichia coli infection     H pylori ulcer     H/O coagulation disorder 2014    HLD (hyperlipidemia)     HTN (hypertension)     Hx of blood clots     Joint pain     Morbid obesity ([facility])     NSTEMI (non-ST elevated myocardial infarction) ([facility]) 2014    Postprocedural non-healing wound     S/P cardiac catheterization: 2014: 99% distal RCA, 90% mid-LAD, 85% ostial diagonal, 60% LCx. 2014: 99% distal RCA, 90% mid-LAD, 85% ostial diagonal, 60% LCx. Dr. Wilson    Stroke ([facility])     Surgical wound, non healing 2024    Non healing scalp wound following wide excision of skin cancer. Has exposed skull    Swelling     Wheezing          PAST SURGICAL HISTORY     Past Surgical History:   Procedure Laterality Date    ANKLE SURGERY  1990    CARDIOVASCULAR STRESS TEST  2010    No obvious stress induced ischemia. EF 71%.     CORONARY ARTERY BYPASS GRAFT  06/03/2014    x4    DIAGNOSTIC 
Form received 5/19/2021. Please note that is takes 7-10 business days for completion. No authorization received with form.   
Serosanguinous 06/11/25 1046   Odor None 06/11/25 1046   Carmen-wound Assessment Dry/flaky 06/11/25 1046   Margins Attached edges 06/11/25 1046   Wound Thickness Description not for Pressure Injury Full thickness 06/11/25 1046   Number of days: 406          Supplies Requested :        *DISPENSE AS WRITTEN*    WOUND #: 1   PRIMARY DRESSING:  Other:Excel Daily SAP 4\" x 4\" - Dispense as written    Cover and Secure with: None     FREQUENCY OF DRESSING CHANGES:  Daily     *DISPENSE AS WRITTEN*    ADDITIONAL ITEMS:  [] Gloves Small  [x] Gloves Medium [] Gloves Large [] Gloves XLarge  [] Tape 1\" [x] Tape 2\" [] Tape 3\"  [] Medipore Tape  [x] Saline  [x] Skin Prep   [] Adhesive Remover   [] Cotton Tip Applicators   [] Other:    Patient Wound(s) Debrided: [x] Yes if yes please add date 6/11/25   [] No    Debridement Type: Mechanical     Patient currently being seen by Home Health: [] Yes   [x] No    Duration for needed supplies:  []15  [x]30  []60  []90 Days        Electronically signed by Brittney Gray RN on 6/11/2025 at 11:27 AM     Provider Information:      PROVIDER'S NAME: Dm Garcia MD    NPI: [id number]

## 2025-06-11 NOTE — PATIENT INSTRUCTIONS
Visit Discharge/Physician Orders:  - Continue following with Dr. Viera for surgery.   - Supplies ordered today  - Do not get your incision wet, wear a cap when you shower       Wound Location: scalp     Dressing orders:     1) Gather wound care supplies and arrange on clean table.     2) Wash your hands with soap and water or use alcohol based hand  for 20 seconds (sing \"Happy Birthday\" twice).    3) Cleanse wounds with normal saline or wound cleanser and gauze. Pat dry with clean gauze.    4) Scalp: Apply skin prep around the wound. Cover with silicone bordered foam. Change daily     Keep all dressings clean & dry.    Follow up visit: as needed post op    We have sent your supply order to the following company:  [employer] Phone # [phone]   If you don't receive the items you were expecting or don't know what the items are that you received, call the company where the order was sent.   If you are unable to obtain wound supplies, continue to use the supplies you have available until you are able to reach us. It is most important to keep the wound covered at all times.  It is YOUR responsibility to make sure that supplies are re-ordered before you run out. Re-order telephone numbers are included in each package.     Keep next scheduled appointment. Please give 24 hour notice if unable to keep appointment. [phone]    If you experience any of the following, please call the Wound Care Service during business hours: Monday through Friday 8:00 am - 4:30 pm  ([phone]).   *Increase in pain   *Temperature over 101   *Increase in drainage from your wound or a foul odor   *Uncontrolled swelling   *Need for compression bandage changes due to slippage, breakthrough drainage    If you need medical attention outside of business hours, please contact your Primary Care Doctor or go to the nearest emergency room.

## 2025-06-16 ENCOUNTER — TELEPHONE (OUTPATIENT)
Dept: WOUND CARE | Age: 80
End: 2025-06-16

## 2025-06-16 NOTE — TELEPHONE ENCOUNTER
Received call from patient regarding getting the wrong supplies. Called [employer] and they stated they will be in contact with patient about getting her the right supplies.

## 2025-06-30 ENCOUNTER — HOSPITAL ENCOUNTER (OUTPATIENT)
Dept: NUCLEAR MEDICINE | Age: 80
Discharge: HOME | End: 2025-06-30
Attending: NUCLEAR MEDICINE
Payer: MEDICARE

## 2025-06-30 ENCOUNTER — HOSPITAL ENCOUNTER (OUTPATIENT)
Age: 80
Discharge: HOME | End: 2025-07-02
Attending: NUCLEAR MEDICINE
Payer: MEDICARE

## 2025-06-30 DIAGNOSIS — R06.02 SHORTNESS OF BREATH: ICD-10-CM

## 2025-06-30 DIAGNOSIS — R94.31 ABNORMAL EKG: ICD-10-CM

## 2025-06-30 LAB
ECHO AO ASC DIAM: 2.8 CM
ECHO AV CUSP MM: 1.4 CM
ECHO AV PEAK GRADIENT: 3 MMHG
ECHO AV PEAK VELOCITY: 0.9 M/S
ECHO AV VELOCITY RATIO: 0.78
ECHO EST RA PRESSURE: 3 MMHG
ECHO LA AREA 2C: 10.6 CM2
ECHO LA AREA 4C: 12.8 CM2
ECHO LA DIAMETER: 3.3 CM
ECHO LA MAJOR AXIS: 4.5 CM
ECHO LA MINOR AXIS: 3.7 CM
ECHO LA VOL BP: 29 ML (ref 22–52)
ECHO LA VOL MOD A2C: 24 ML (ref 22–52)
ECHO LA VOL MOD A4C: 29 ML (ref 22–52)
ECHO LV E' LATERAL VELOCITY: 9.7 CM/S
ECHO LV E' SEPTAL VELOCITY: 4.9 CM/S
ECHO LV EJECTION FRACTION 3D: 55 %
ECHO LV FRACTIONAL SHORTENING: 28 % (ref 28–44)
ECHO LV INTERNAL DIMENSION DIASTOLIC: 4 CM (ref 3.9–5.3)
ECHO LV INTERNAL DIMENSION SYSTOLIC: 2.9 CM
ECHO LV ISOVOLUMETRIC RELAXATION TIME (IVRT): 85 MS
ECHO LV IVSD: 0.9 CM (ref 0.6–0.9)
ECHO LV MASS 2D: 93.5 G (ref 67–162)
ECHO LV POSTERIOR WALL DIASTOLIC: 0.7 CM (ref 0.6–0.9)
ECHO LV RELATIVE WALL THICKNESS RATIO: 0.35
ECHO LVOT PEAK GRADIENT: 2 MMHG
ECHO LVOT PEAK VELOCITY: 0.7 M/S
ECHO MV A VELOCITY: 0.69 M/S
ECHO MV E DECELERATION TIME (DT): 262 MS
ECHO MV E VELOCITY: 0.64 M/S
ECHO MV E/A RATIO: 0.93
ECHO MV E/E' LATERAL: 6.6
ECHO MV E/E' RATIO (AVERAGED): 9.83
ECHO MV E/E' SEPTAL: 13.06
ECHO PULMONARY ARTERY END DIASTOLIC PRESSURE: 6 MMHG
ECHO PV MAX VELOCITY: 0.8 M/S
ECHO PV PEAK GRADIENT: 3 MMHG
ECHO PV REGURGITANT MAX VELOCITY: 1.2 M/S
ECHO RIGHT VENTRICULAR SYSTOLIC PRESSURE (RVSP): 28 MMHG
ECHO RV INTERNAL DIMENSION: 2.6 CM
ECHO RV TAPSE: 1.7 CM (ref 1.7–?)
ECHO TV E WAVE: 0.6 M/S
ECHO TV REGURGITANT MAX VELOCITY: 2.51 M/S
ECHO TV REGURGITANT PEAK GRADIENT: 25 MMHG
NUC STRESS EJECTION FRACTION: 80 %
STRESS BASELINE DIAS BP: 68 MMHG
STRESS BASELINE HR: 79 BPM
STRESS BASELINE SYS BP: 141 MMHG
STRESS ESTIMATED WORKLOAD: 1 METS
STRESS PEAK DIAS BP: 46 MMHG
STRESS PEAK SYS BP: 145 MMHG
STRESS PERCENT HR ACHIEVED: 71 %
STRESS POST PEAK HR: 99 BPM
STRESS RATE PRESSURE PRODUCT: NORMAL BPM*MMHG
STRESS STAGE 1 BP: NORMAL MMHG
STRESS STAGE 1 DURATION: 1 MIN:SEC
STRESS STAGE 1 HR: 93 BPM
STRESS STAGE 2 BP: NORMAL MMHG
STRESS STAGE 2 DURATION: 1 MIN:SEC
STRESS STAGE 2 HR: 97 BPM
STRESS STAGE 3 BP: NORMAL MMHG
STRESS STAGE 3 DURATION: 1 MIN:SEC
STRESS STAGE 3 HR: 88 BPM
STRESS STAGE RECOVERY 1 BP: NORMAL MMHG
STRESS STAGE RECOVERY 1 DURATION: 1 MIN:SEC
STRESS STAGE RECOVERY 1 HR: 88 BPM
STRESS STAGE RECOVERY 2 BP: NORMAL MMHG
STRESS STAGE RECOVERY 2 DURATION: 1 MIN:SEC
STRESS STAGE RECOVERY 2 HR: 82 BPM
STRESS STAGE RECOVERY 3 BP: NORMAL MMHG
STRESS STAGE RECOVERY 3 DURATION: 1 MIN:SEC
STRESS STAGE RECOVERY 3 HR: 86 BPM
STRESS STAGE RECOVERY 4 BP: NORMAL MMHG
STRESS STAGE RECOVERY 4 DURATION: 2 MIN:SEC
STRESS STAGE RECOVERY 4 HR: 84 BPM
STRESS TARGET HR: 140 BPM

## 2025-06-30 PROCEDURE — 93306 TTE W/DOPPLER COMPLETE: CPT | Performed by: NUCLEAR MEDICINE

## 2025-06-30 PROCEDURE — 6360000002 HC RX W HCPCS: Performed by: NUCLEAR MEDICINE

## 2025-06-30 PROCEDURE — 78452 HT MUSCLE IMAGE SPECT MULT: CPT

## 2025-06-30 PROCEDURE — 93017 CV STRESS TEST TRACING ONLY: CPT

## 2025-06-30 PROCEDURE — 3430000000 HC RX DIAGNOSTIC RADIOPHARMACEUTICAL: Performed by: NUCLEAR MEDICINE

## 2025-06-30 PROCEDURE — 93018 CV STRESS TEST I&R ONLY: CPT | Performed by: NUCLEAR MEDICINE

## 2025-06-30 PROCEDURE — A9500 TC99M SESTAMIBI: HCPCS | Performed by: NUCLEAR MEDICINE

## 2025-06-30 PROCEDURE — 93016 CV STRESS TEST SUPVJ ONLY: CPT | Performed by: NUCLEAR MEDICINE

## 2025-06-30 PROCEDURE — 93306 TTE W/DOPPLER COMPLETE: CPT

## 2025-06-30 PROCEDURE — 78452 HT MUSCLE IMAGE SPECT MULT: CPT | Performed by: NUCLEAR MEDICINE

## 2025-06-30 RX ORDER — TETRAKIS(2-METHOXYISOBUTYLISOCYANIDE)COPPER(I) TETRAFLUOROBORATE 1 MG/ML
34.7 INJECTION, POWDER, LYOPHILIZED, FOR SOLUTION INTRAVENOUS
Status: COMPLETED | OUTPATIENT
Start: 2025-06-30 | End: 2025-06-30

## 2025-06-30 RX ORDER — TETRAKIS(2-METHOXYISOBUTYLISOCYANIDE)COPPER(I) TETRAFLUOROBORATE 1 MG/ML
9.56 INJECTION, POWDER, LYOPHILIZED, FOR SOLUTION INTRAVENOUS
Status: COMPLETED | OUTPATIENT
Start: 2025-06-30 | End: 2025-06-30

## 2025-06-30 RX ORDER — REGADENOSON 0.08 MG/ML
0.4 INJECTION, SOLUTION INTRAVENOUS
Status: COMPLETED | OUTPATIENT
Start: 2025-06-30 | End: 2025-06-30

## 2025-06-30 RX ADMIN — Medication 9.56 MILLICURIE: at 10:45

## 2025-06-30 RX ADMIN — REGADENOSON 0.4 MG: 0.08 INJECTION, SOLUTION INTRAVENOUS at 11:51

## 2025-06-30 RX ADMIN — Medication 34.7 MILLICURIE: at 11:55

## 2025-07-01 ENCOUNTER — TELEPHONE (OUTPATIENT)
Dept: CARDIOLOGY CLINIC | Age: 80
End: 2025-07-01

## 2025-07-01 NOTE — TELEPHONE ENCOUNTER
Pt needs cleared for scalp reconstruction with skin flap advancement with Dr. Viera.   Stress and echo completed on 6/30.  Is patient clear?  On coumadin per Dr. Koo.   Form in Dr. Navarro's box.

## 2025-07-22 ENCOUNTER — TRANSCRIBE ORDERS (OUTPATIENT)
Dept: ADMINISTRATIVE | Age: 80
End: 2025-07-22

## 2025-07-22 DIAGNOSIS — R11.2 NAUSEA AND VOMITING, UNSPECIFIED VOMITING TYPE: Primary | ICD-10-CM

## 2025-07-22 DIAGNOSIS — R10.13 EPIGASTRIC PAIN: ICD-10-CM

## 2025-08-18 ENCOUNTER — HOSPITAL ENCOUNTER (OUTPATIENT)
Dept: CT IMAGING | Age: 80
Discharge: HOME | End: 2025-08-18
Payer: MEDICARE

## 2025-08-18 DIAGNOSIS — R10.13 EPIGASTRIC PAIN: ICD-10-CM

## 2025-08-18 DIAGNOSIS — R11.2 NAUSEA AND VOMITING, UNSPECIFIED VOMITING TYPE: ICD-10-CM

## 2025-08-18 LAB — POC CREATININE WHOLE BLOOD: 1.1 MG/DL (ref 0.5–1.2)

## 2025-08-18 PROCEDURE — 6360000004 HC RX CONTRAST MEDICATION

## 2025-08-18 PROCEDURE — 82565 ASSAY OF CREATININE: CPT

## 2025-08-18 PROCEDURE — 74177 CT ABD & PELVIS W/CONTRAST: CPT

## 2025-08-18 RX ORDER — IOPAMIDOL 755 MG/ML
80 INJECTION, SOLUTION INTRAVASCULAR
Status: COMPLETED | OUTPATIENT
Start: 2025-08-18 | End: 2025-08-18

## 2025-08-18 RX ADMIN — IOPAMIDOL 80 ML: 755 INJECTION, SOLUTION INTRAVENOUS at 15:48
